# Patient Record
Sex: FEMALE | Race: BLACK OR AFRICAN AMERICAN | Employment: FULL TIME | URBAN - METROPOLITAN AREA
[De-identification: names, ages, dates, MRNs, and addresses within clinical notes are randomized per-mention and may not be internally consistent; named-entity substitution may affect disease eponyms.]

---

## 2017-01-18 ENCOUNTER — HOSPITAL ENCOUNTER (OUTPATIENT)
Dept: CT IMAGING | Age: 52
Discharge: HOME | End: 2017-01-18
Attending: NURSE PRACTITIONER
Payer: COMMERCIAL

## 2017-01-18 DIAGNOSIS — R31.29 MICROHEMATURIA: ICD-10-CM

## 2017-01-18 PROCEDURE — 74011000258 HC RX REV CODE- 258

## 2017-01-18 PROCEDURE — 74011636320 HC RX REV CODE- 636/320

## 2017-01-18 PROCEDURE — 74178 CT ABD&PLV WO CNTR FLWD CNTR: CPT

## 2017-01-18 RX ORDER — SODIUM CHLORIDE 0.9 % (FLUSH) 0.9 %
10 SYRINGE (ML) INJECTION
Status: COMPLETED | OUTPATIENT
Start: 2017-01-18 | End: 2017-01-18

## 2017-01-18 RX ADMIN — IOPAMIDOL 100 ML: 755 INJECTION, SOLUTION INTRAVENOUS at 09:27

## 2017-01-18 RX ADMIN — SODIUM CHLORIDE 100 ML: 900 INJECTION, SOLUTION INTRAVENOUS at 09:27

## 2017-01-18 RX ADMIN — Medication 10 ML: at 09:27

## 2017-01-30 ENCOUNTER — HOSPITAL ENCOUNTER (OUTPATIENT)
Dept: MAMMOGRAPHY | Age: 52
Discharge: HOME | End: 2017-01-30
Attending: NURSE PRACTITIONER
Payer: COMMERCIAL

## 2017-01-30 DIAGNOSIS — Z12.39 SCREENING FOR BREAST CANCER: ICD-10-CM

## 2017-01-30 PROCEDURE — 77067 SCR MAMMO BI INCL CAD: CPT

## 2017-01-31 NOTE — PROGRESS NOTES
Notify pt of the need for further imaging and then call [facility] and ask what U/S or imaging study that they need for me to order.  Then I will order it

## 2017-02-08 ENCOUNTER — HOSPITAL ENCOUNTER (OUTPATIENT)
Dept: MAMMOGRAPHY | Age: 52
Discharge: HOME | End: 2017-02-08
Attending: NURSE PRACTITIONER
Payer: COMMERCIAL

## 2017-02-08 DIAGNOSIS — R92.8 ABNORMAL MAMMOGRAM OF RIGHT BREAST: ICD-10-CM

## 2017-02-08 PROCEDURE — 76642 ULTRASOUND BREAST LIMITED: CPT

## 2017-02-08 NOTE — PROGRESS NOTES
U/S birads 2 benign. Likely a sebaceous cyst to right axilla. Had referred to [facility] Surgery prior to look at. Did she go?

## 2017-03-13 PROBLEM — L50.9 URTICARIA: Status: ACTIVE | Noted: 2017-03-13

## 2017-03-13 PROBLEM — Z87.898 HISTORY OF ITCHING: Status: ACTIVE | Noted: 2017-03-13

## 2018-12-03 PROBLEM — E66.01 SEVERE OBESITY (HCC): Status: ACTIVE | Noted: 2018-12-03

## 2018-12-21 ENCOUNTER — HOSPITAL ENCOUNTER (OUTPATIENT)
Dept: MAMMOGRAPHY | Age: 53
Discharge: HOME | End: 2018-12-21
Attending: FAMILY MEDICINE
Payer: COMMERCIAL

## 2018-12-21 DIAGNOSIS — Z12.39 BREAST CANCER SCREENING: ICD-10-CM

## 2018-12-21 PROCEDURE — 77067 SCR MAMMO BI INCL CAD: CPT

## 2019-06-07 ENCOUNTER — HOSPITAL ENCOUNTER (OUTPATIENT)
Age: 54
Setting detail: OUTPATIENT SURGERY
Discharge: HOME | End: 2019-06-07
Attending: SURGERY | Admitting: SURGERY
Payer: COMMERCIAL

## 2019-06-07 ENCOUNTER — ANESTHESIA EVENT (OUTPATIENT)
Dept: ENDOSCOPY | Age: 54
End: 2019-06-07
Payer: COMMERCIAL

## 2019-06-07 ENCOUNTER — ANESTHESIA (OUTPATIENT)
Dept: ENDOSCOPY | Age: 54
End: 2019-06-07
Payer: COMMERCIAL

## 2019-06-07 VITALS
SYSTOLIC BLOOD PRESSURE: 134 MMHG | WEIGHT: 185 LBS | TEMPERATURE: 98.2 F | DIASTOLIC BLOOD PRESSURE: 68 MMHG | HEART RATE: 74 BPM | HEIGHT: 61 IN | RESPIRATION RATE: 15 BRPM | BODY MASS INDEX: 34.93 KG/M2 | OXYGEN SATURATION: 99 %

## 2019-06-07 PROCEDURE — 76060000031 HC ANESTHESIA FIRST 0.5 HR: Performed by: SURGERY

## 2019-06-07 PROCEDURE — 76040000025: Performed by: SURGERY

## 2019-06-07 PROCEDURE — 74011250636 HC RX REV CODE- 250/636: Performed by: ANESTHESIOLOGY

## 2019-06-07 PROCEDURE — 74011250636 HC RX REV CODE- 250/636

## 2019-06-07 RX ORDER — SODIUM CHLORIDE 0.9 % (FLUSH) 0.9 %
5-40 SYRINGE (ML) INJECTION AS NEEDED
Status: CANCELLED | OUTPATIENT
Start: 2019-06-07

## 2019-06-07 RX ORDER — OXYCODONE AND ACETAMINOPHEN 10; 325 MG/1; MG/1
1 TABLET ORAL AS NEEDED
Status: CANCELLED | OUTPATIENT
Start: 2019-06-07

## 2019-06-07 RX ORDER — HYDROMORPHONE HYDROCHLORIDE 2 MG/ML
0.5 INJECTION, SOLUTION INTRAMUSCULAR; INTRAVENOUS; SUBCUTANEOUS
Status: CANCELLED | OUTPATIENT
Start: 2019-06-07

## 2019-06-07 RX ORDER — SODIUM CHLORIDE, SODIUM LACTATE, POTASSIUM CHLORIDE, CALCIUM CHLORIDE 600; 310; 30; 20 MG/100ML; MG/100ML; MG/100ML; MG/100ML
75 INJECTION, SOLUTION INTRAVENOUS CONTINUOUS
Status: DISCONTINUED | OUTPATIENT
Start: 2019-06-07 | End: 2019-06-07 | Stop reason: HOSPADM

## 2019-06-07 RX ORDER — OXYCODONE HYDROCHLORIDE 5 MG/1
5 TABLET ORAL
Status: CANCELLED | OUTPATIENT
Start: 2019-06-07 | End: 2019-06-08

## 2019-06-07 RX ORDER — SODIUM CHLORIDE 0.9 % (FLUSH) 0.9 %
5-40 SYRINGE (ML) INJECTION EVERY 8 HOURS
Status: CANCELLED | OUTPATIENT
Start: 2019-06-07

## 2019-06-07 RX ORDER — PROPOFOL 10 MG/ML
INJECTION, EMULSION INTRAVENOUS AS NEEDED
Status: DISCONTINUED | OUTPATIENT
Start: 2019-06-07 | End: 2019-06-07 | Stop reason: HOSPADM

## 2019-06-07 RX ORDER — LIDOCAINE HYDROCHLORIDE 20 MG/ML
INJECTION, SOLUTION EPIDURAL; INFILTRATION; INTRACAUDAL; PERINEURAL AS NEEDED
Status: DISCONTINUED | OUTPATIENT
Start: 2019-06-07 | End: 2019-06-07 | Stop reason: HOSPADM

## 2019-06-07 RX ORDER — PROPOFOL 10 MG/ML
INJECTION, EMULSION INTRAVENOUS
Status: DISCONTINUED | OUTPATIENT
Start: 2019-06-07 | End: 2019-06-07 | Stop reason: HOSPADM

## 2019-06-07 RX ADMIN — LIDOCAINE HYDROCHLORIDE 60 MG: 20 INJECTION, SOLUTION EPIDURAL; INFILTRATION; INTRACAUDAL; PERINEURAL at 10:38

## 2019-06-07 RX ADMIN — PROPOFOL 160 MCG/KG/MIN: 10 INJECTION, EMULSION INTRAVENOUS at 10:38

## 2019-06-07 RX ADMIN — PROPOFOL 40 MG: 10 INJECTION, EMULSION INTRAVENOUS at 10:38

## 2019-06-07 RX ADMIN — SODIUM CHLORIDE, SODIUM LACTATE, POTASSIUM CHLORIDE, AND CALCIUM CHLORIDE 75 ML/HR: 600; 310; 30; 20 INJECTION, SOLUTION INTRAVENOUS at 09:52

## 2019-06-07 NOTE — PROCEDURES
Procedure in Detail:  Informed consent was obtained for the procedure. The patient was placed in the left lateral decubitus position and sedation was induced by anesthesia. The [id number] was inserted into the rectum and advanced under direct vision to the cecum, which was identified by the ileocecal valve and appendiceal orifice. The quality of the colonic preparation was good. A careful inspection was made as the colonoscope was withdrawn, including a retroflexed view of the rectum; findings and interventions are described below. Appropriate photodocumentation was obtained. Findings:   Mild generalized melanosis noted. ANUS: Anal exam reveals no masses or hemorrhoids, sphincter tone is normal.   RECTUM: Rectal exam reveals no masses or hemorrhoids. SIGMOID COLON: The mucosa is normal with good vascular pattern and without ulcers, diverticula, and polyps. DESCENDING COLON: The mucosa is normal with good vascular pattern and without ulcers, diverticula, and polyps. SPLENIC FLEXURE: The splenic flexure is normal.   TRANSVERSE COLON: The mucosa is normal with good vascular pattern and without ulcers, diverticula, and polyps. HEPATIC FLEXURE: The hepatic flexure is normal.   ASCENDING COLON: The mucosa is normal with good vascular pattern and without ulcers, diverticula, and polyps. CECUM: The appendiceal orifice appears normal. The ileocecal valve appears normal.   TERMINAL ILEUM: The terminal ileum was not entered. Specimens: No specimens were collected. Complications: None; patient tolerated the procedure well. \    EBL - none    Recommendations:   - For colon cancer screening in this average-risk patient, colonoscopy may be repeated in 10 years.      Signed By: Chepe Hewitt MD                        June 7, 2019

## 2019-06-07 NOTE — ANESTHESIA PREPROCEDURE EVALUATION
Relevant Problems   No relevant active problems       Anesthetic History   No history of anesthetic complications            Review of Systems / Medical History  Patient summary reviewed and pertinent labs reviewed    Pulmonary  Within defined limits                 Neuro/Psych   Within defined limits           Cardiovascular                  Exercise tolerance: >4 METS     GI/Hepatic/Renal     GERD           Endo/Other        Morbid obesity and arthritis     Other Findings            Physical Exam    Airway  Mallampati: III  TM Distance: > 6 cm  Neck ROM: normal range of motion   Mouth opening: Normal     Cardiovascular    Rhythm: regular           Dental    Dentition: Caps/crowns     Pulmonary                 Abdominal  GI exam deferred       Other Findings            Anesthetic Plan    ASA: 3            Induction: Intravenous  Anesthetic plan and risks discussed with: Patient

## 2019-06-07 NOTE — PROGRESS NOTES
's pre-procedure visit requested by patient. Conveyed care and concern for patient and family. Offered prayer as requested for patient, family, and staff.     Nga Osullivan MDiv, BS  Board Certified

## 2019-06-07 NOTE — DISCHARGE INSTRUCTIONS
Kimberlyn Davila M.D.  [phone]    Instructions following colonoscopy:    ACTIVITY:   Resume usual, basic activities around the house today.  You may be light-headed or sleepy from anesthesia, so be careful going up and down stairs.  Avoid driving, operating machinery, or signing documents for 24 hours. DIET:   No restriction. Please note, some people may have nausea or cramps after this procedure which can result in an upset stomach after eating.  Many people have loose stools or diarrhea immediately after colonoscopy. It is also not uncommon to not have a bowel movement for 2-3 days. PAIN:   Some cramping or gas pain is normal after colonoscopy. However, if you experience worsening pain over the course of the day, or pain with associated fever please call the office immediately      [address] IF:   You have a temperature higher than 101.5° Fahrenheit for more than 6 hours.  You have severe nausea or vomiting not relieved by medication; or diarrhea. If you take a blood thinning medicine resume it:    Otherwise, continue home medications as previously prescribed.

## 2019-06-07 NOTE — ANESTHESIA POSTPROCEDURE EVALUATION
Procedure(s):  COLONOSCOPY/ 36. No value filed. Anesthesia Post Evaluation      Multimodal analgesia: multimodal analgesia not used between 6 hours prior to anesthesia start to PACU discharge  Patient location during evaluation: PACU  Patient participation: complete - patient participated  Level of consciousness: awake  Pain management: adequate  Airway patency: patent  Anesthetic complications: no  Cardiovascular status: acceptable  Respiratory status: acceptable  Hydration status: acceptable  Post anesthesia nausea and vomiting:  none      Vitals Value Taken Time   /70 6/7/2019 10:58 AM   Temp     Pulse 81 6/7/2019 10:58 AM   Resp 16 6/7/2019 10:58 AM   SpO2 100 % 6/7/2019 10:58 AM   Vitals shown include unvalidated device data.

## 2019-06-07 NOTE — H&P
History and Physical      Patient: Simaa Blizzard    Physician: Yandel Velarde MD    Referring Physician: Lauro Laboy MD    Chief Complaint: For colonoscopy    History of Present Illness: Pt presents for colonoscopy. iniital screening study. History:  Past Medical History:   Diagnosis Date    Arthritis     in the left knee and heel    GERD (gastroesophageal reflux disease)      Past Surgical History:   Procedure Laterality Date    HX TONSILLECTOMY  2015      Social History     Socioeconomic History    Marital status:      Spouse name: Not on file    Number of children: Not on file    Years of education: Not on file    Highest education level: Not on file   Tobacco Use    Smoking status: Never Smoker    Smokeless tobacco: Never Used   Substance and Sexual Activity    Alcohol use: No     Alcohol/week: 0.0 oz    Drug use: Not Currently   Other Topics Concern     Service No    Blood Transfusions No    Caffeine Concern No    Occupational Exposure No    Hobby Hazards No    Sleep Concern No    Stress Concern No    Weight Concern No    Special Diet No    Back Care Yes    Exercise No    Bike Helmet No    Seat Belt Yes    Self-Exams Yes      Family History   Problem Relation Age of Onset    Hypertension Father     Stroke Father     Cancer Sister         lung    Breast Cancer Neg Hx        Medications:   Prior to Admission medications    Medication Sig Start Date End Date Taking? Authorizing Provider   hydrOXYzine HCl (ATARAX) 10 mg tablet 1 tab p.o. twice daily as needed itching. 3/4/19  Yes Lauro Laboy MD   fluticasone (FLONASE) 50 mcg/actuation nasal spray 2 Sprays by Both Nostrils route daily. Patient taking differently: 2 Sprays by Both Nostrils route as needed. 3/4/19   Leonor Copeland MD   omeprazole (PRILOSEC) 20 mg capsule Take 1 Cap by mouth daily. Patient taking differently: Take 20 mg by mouth as needed.  Indications: gastroesophageal reflux disease 12/3/18   Damon Stein MD       Allergies: No Known Allergies    Physical Exam:     Vital Signs:   Visit Vitals  /77   Pulse 71   Temp 98.2 °F (36.8 °C)   Resp 18   Ht 5' 1\" (1.549 m)   Wt 185 lb (83.9 kg)   SpO2 99%   BMI 34.96 kg/m²     . General: no distress      Heart: regular   Lungs: unlabored   Abdominal: soft   Neurological: Grossly normal        Findings/Diagnosis: Screening for colorectal cancer     Plan of Care/Planned Procedure: Colonoscopy, possible polypectomy. Pt/designee has reviewed the colonoscopy information sheet. Any questions have been discussed. They agree to proceed.       Signed:  Jocelin Jaime MD   6/7/2019

## 2019-06-07 NOTE — ROUTINE PROCESS
VSS. Discharge instructions reviewed with patient and daughter and copy of instructions sent home with patient. Dr. Gurvinder Bañuelos spoke with patient and daughter prior to discharge. Questions answered. Discharged via private car, wheeled out by myself. IV discontinued prior to discharge.      Personal items with patient at discharge: clothing

## 2020-03-12 ENCOUNTER — HOSPITAL ENCOUNTER (OUTPATIENT)
Dept: MAMMOGRAPHY | Age: 55
Discharge: HOME | End: 2020-03-12
Attending: FAMILY MEDICINE
Payer: COMMERCIAL

## 2020-03-12 DIAGNOSIS — Z12.31 VISIT FOR SCREENING MAMMOGRAM: ICD-10-CM

## 2020-03-12 PROCEDURE — 77067 SCR MAMMO BI INCL CAD: CPT

## 2020-12-14 PROBLEM — L50.9 URTICARIA: Status: RESOLVED | Noted: 2017-03-13 | Resolved: 2020-12-14

## 2020-12-14 PROBLEM — Z87.898 HISTORY OF ITCHING: Status: RESOLVED | Noted: 2017-03-13 | Resolved: 2020-12-14

## 2021-03-15 ENCOUNTER — TRANSCRIBE ORDER (OUTPATIENT)
Dept: SCHEDULING | Age: 56
End: 2021-03-15

## 2021-03-15 DIAGNOSIS — Z12.31 SCREENING MAMMOGRAM, ENCOUNTER FOR: Primary | ICD-10-CM

## 2021-03-23 PROBLEM — R73.03 PREDIABETES: Status: ACTIVE | Noted: 2021-03-23

## 2021-03-31 ENCOUNTER — HOSPITAL ENCOUNTER (OUTPATIENT)
Dept: MAMMOGRAPHY | Age: 56
Discharge: HOME | End: 2021-03-31
Attending: NURSE PRACTITIONER
Payer: COMMERCIAL

## 2021-03-31 DIAGNOSIS — Z12.31 SCREENING MAMMOGRAM, ENCOUNTER FOR: ICD-10-CM

## 2021-03-31 PROCEDURE — 77067 SCR MAMMO BI INCL CAD: CPT

## 2021-04-02 NOTE — PROGRESS NOTES
Benign appearing lymph nodes, scattered fibroglandular areas; no findings that suggest malignancy. Follow-up mammogram in one year.

## 2021-04-07 ENCOUNTER — TELEPHONE (OUTPATIENT)
Dept: NUTRITION | Age: 56
End: 2021-04-07

## 2021-04-07 NOTE — TELEPHONE ENCOUNTER
Nutrition Counseling: Contacted pt regarding referral. See notes documented in Nutrition Counseling Referral for details. No further follow-up contact from pt. Will close referral for this office.     [address] [facility]  [phone]

## 2021-04-14 ENCOUNTER — HOSPITAL ENCOUNTER (OUTPATIENT)
Dept: PHYSICAL THERAPY | Age: 56
Discharge: HOME | End: 2021-04-14
Attending: NURSE PRACTITIONER
Payer: COMMERCIAL

## 2021-04-14 DIAGNOSIS — M54.6 CHRONIC RIGHT-SIDED THORACIC BACK PAIN: ICD-10-CM

## 2021-04-14 DIAGNOSIS — G89.29 CHRONIC RIGHT-SIDED THORACIC BACK PAIN: ICD-10-CM

## 2021-04-14 PROCEDURE — 97162 PT EVAL MOD COMPLEX 30 MIN: CPT

## 2021-04-14 PROCEDURE — 97140 MANUAL THERAPY 1/> REGIONS: CPT

## 2021-04-14 NOTE — THERAPY EVALUATION
Hansel Huang : 1965 Primary: [facility] [place] [facility] Secondary:  Therapy Center at [address] [place] [address] 68, [address] [facility], [place] [address] [address] [facility] [phone]   [phone] OUTPATIENT PHYSICAL THERAPY:Initial Assessment 2021 ICD-10: Treatment Diagnosis: Cervicalgia (M54.2) Pain in thoracic spine (M54.6) Stiffness of right shoulder, not elsewhere classified (M25.611) Precautions/Allergies:  
Patient has no known allergies. TREATMENT PLAN: 
Effective Dates/Frequency/Duration: Twice per week from 2021 until 2021 (10 weeks). MEDICAL/REFERRING DIAGNOSIS: 
Chronic right-sided thoracic back pain [M54.6, G89.29] DATE OF ONSET: 1 year ago (acute exacerbation of chronic pain) REFERRING PHYSICIAN: Macario Weber NP MD Orders: Evaluate and treat Return MD Appointment: unspecified INITIAL ASSESSMENT:  Hansel Huang is a 60 y.o. female who presents to physical therapy for acute exacerbation of chronic mid back pain. This session, pt demonstrated decreased B UE strength/endurance, decreased right shoulder and thoracic mobility with associated pain, multiple postural deficits, decreased sitting tolerance and decreased functional mobility as evident by a score of 1/50 on the Neck Disability Index (with higher scores indicating increased disability), 18/55 on the Disabilities of the Arm, Shoulder, and Hand Questionnaire (with higher scores indicating increased disability) and decreased ability to turn/lift. Pt may benefit from skilled PT to address the above listed deficits to improve ability to perform pain-free ADLs/IADLs and to improve overall quality of life prior to discharge. PROBLEM LIST (Impacting functional limitations): 1. Decreased Strength 2. Decreased ADL/Functional Activities 3. Increased Pain 4. Decreased Activity Tolerance 5. Decreased Work Simplification/Energy Conservation Techniques 6. Increased Fatigue 7. Decreased Flexibility/Joint Mobility 8. Edema/Girth INTERVENTIONS PLANNED: (Treatment may consist of any combination of the following) 1. Bed Mobility 2. Cold 3. Electrical Stimulation 4. Heat 5. Home Exercise Program (HEP) 6. Manual Therapy 7. Neuromuscular Re-education/Strengthening 8. Range of Motion (ROM) 9. Therapeutic Activites 10. Therapeutic Exercise/Strengthening 11. Transcutaneous Electrical Nerve Stimulation (TENS) 12. Ultrasound (US)  
 
GOALS: (Goals have been discussed and agreed upon with patient.) Short-Term Functional Goals: Time Frame: 5 weeks 1. Pt will be compliant with HEP in order to increase cervical mobility and UE strength/endurance/mobility to improve functional mobility and overall quality of life. 2. Pt will improve score on the Disabilities of the Arm, Shoulder, and Hand (DASH) Questionnaire to 14/55 in order to improve independence with ADLs and IADLs and to improve overall quality of life. 3. Pt will reduce score on Neck Disability Index to 0/50 in order to demonstrate improved function during activities of daily living. 4. Pt will increase right shoulder internal rotation AROM to level of T10 with minimal to no increase in pain in order to improve functional mobility and ability to wash her back. 5. Pt will be able to demonstrate good body mechanics while lifting 5 lb object up from the floor in order to minimize pain while lifting parts at her job. Discharge Goals: Time Frame: 10 weeks 1. Pt will be independent with HEP in order to increase cervical mobility and UE strength/endurance/mobility to improve functional mobility and overall quality of life. 2. Pt will improve score on the Disabilities of the Arm, Shoulder, and Hand (DASH) Questionnaire to 11/55 in order to improve independence with ADLs and IADLs and to improve overall quality of life.  
3. Pt will increase right shoulder internal rotation AROM to level of T8 with minimal to no increase in pain in order to improve functional mobility and ability to wash her back. 4. Pt will be able to demonstrate good body mechanics while lifting 15 lb object up from the floor in order to minimize pain while lifting parts at her job. OUTCOME MEASURE:  
Tool Used: Disabilities of the Arm, Shoulder and Hand (DASH) Questionnaire - Quick Version Score:  Initial: 18/55  Most Recent: X/55 (Date: -- ) Interpretation of Score: The DASH is designed to measure the activities of daily living in person's with upper extremity dysfunction or pain. Each section is scored on a 1-5 scale, 5 representing the greatest disability. The scores of each section are added together for a total score of 55. Tool Used: Neck Disability Index (NDI) Score:  Initial: 1/50  Most Recent: X/50 (Date: -- ) Interpretation of Score: The Neck Disability Index is a revised form of the Oswestry Low Back Pain Index and is designed to measure the activities of daily living in person's with neck pain. Each section is scored on a 0-5 scale, 5 representing the greatest disability. The scores of each section are added together for a total score of 50. FALL RISK: 
 
Ambulatory/Rehab Services H2 Model Falls Risk Assessment Risk Factors: 
     No Risk Factors Identified Ability to Rise from Chair: 
     (0)  Ability to rise in a single movement Falls Prevention Plan: No modifications necessary Total: (5 or greater = High Risk): 0  
©2010 [facility] of [employer] States Patent #[id number]. Federal Law prohibits the replication, distribution or use without written permission from [facility] [employer] UPDATED OBJECTIVE FINDINGS:    
Initial assessment only today: see objective section below for details MEDICAL NECESSITY:  
· Patient is expected to demonstrate progress in strength, range of motion and functional technique to improve ability to perform pain-free ADLs/IADLs and to improve overall quality of life. 
REASON FOR SERVICES/OTHER COMMENTS: 
· Patient continues to require modification of therapeutic interventions to increase complexity of exercises. Total Duration: PT Patient Time In/Time Out Time In: 1838 Time Out: 1021 Rehabilitation Potential For Stated Goals: Good Regarding Nikki Roe's therapy, I certify that the treatment plan above will be carried out by a therapist or under their direction. Thank you for this referral, Ainsley Egan DPT Referring Physician Signature: Liz Castanon NP              
 
PAIN/SUBJECTIVE:  
Initial: 7/10 (sore and stiff) Post Session:  0/10 HISTORY:  
History of Injury/Illness (Reason for Referral): *History per pt or pt's family except where otherwise noted Location(s) of Injury: R>L thoracic region; states occasionally her R upper trap/deltoid will ache Date of Injury: original injury was 25 years ago, pain started to get worse again about a year ago (states most likely due to her working more hours) Mechanism of Injury: States she originally pulled a muscle in her R midback when working - states it has gotten flared up again recently due to her working and lifting Pain at Worst: 8-9/10 Aggravating Activities: lying on either side >15 minutes increases stiffness, states occasionally if she moves the \"wrong way\" she'll get a \"catch\" in her back and then she gets spasms as well, turning to R side/bending can cause pain if she does not do it carefully, prolonged sitting > 60 minutes Pain at Best: 0/10 Easing Activities: moving to a different position, massage Past Medical History/Comorbidities: Ms. Melania Ball  has a past medical history of Arthritis, Elevated blood pressure reading without diagnosis of hypertension (70/47/8435), Folliculitis (06/6/2504), Folliculitis (64/4/1982), GERD (gastroesophageal reflux disease), History of itching (3/13/2017), Nail thickening (12/1/2015), and Urticaria (3/13/2017).  She also has no past medical history of Chronic kidney disease, Diabetes ([facility] [address] or Liver disease. Ms. Angie Franklin  has a past surgical history that includes hx tonsillectomy (2015); pr colonoscopy flx dx w/collj spec when pfrmd (6/7/2019); and colonoscopy (N/A, 6/7/2019). Social History/Living Environment:  
Lives with  and grown son (help as needed) in single level house with no steps to get in 
Prior Level of Function/Work/Activity: 
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  
      RIGHT Other Clinical Tests:   
      none Previous Treatment Approaches:   
      Therapy after initial injury helped a lot Personal Factors:   
      Sex:  female Age:  60 y.o. Current Medications:   
  
Current Outpatient Medications:   cyclobenzaprine (FLEXERIL) 5 mg tablet, Take 1 Tab by mouth two (2) times daily as needed for Muscle Spasm(s). , Disp: 28 Tab, Rfl: 0 
  loratadine (Claritin) 10 mg tablet, Take 1 Tab by mouth daily as needed for Allergies or Itching., Disp: 90 Tab, Rfl: 0 
  olopatadine (PATADAY) 0.2 % drop ophthalmic solution, Administer 1 Drop to both eyes daily as needed for Other (allergic conjunctivitis). Indications: allergic conjunctivitis, Disp: 5 mL, Rfl: 0 
  fluticasone propionate (Flonase) 50 mcg/actuation nasal spray, 2 Sprays by Both Nostrils route as needed for Allergies. , Disp: 1 Bottle, Rfl: 3 Date Last Reviewed:  4/14/2021 Number of Personal Factors/Comorbidities that affect the Plan of Care: 1-2: MODERATE COMPLEXITY EXAMINATION:  
Initial assessment on 4/14/2021 Observation/Orthostatic Postural Assessment: The following postural deficits were noted in sitting: loss of cervical lordosis, increased thoracic kyphosis, forward head, rounded shoulders and posterior pelvic tilt The following postural deficits were noted in standing: excessive lumbar lordosis; pt overweight Palpation:   
      Central and unilateral (each side) PA mobilizations at C6-T7 painful and stiff; central PA mobilizations at lumbar and cervical spine slightly stiff but not tender ROM:   
Initial measurement: (on 4/14/2021) Initial measurement: (on 4/14/2021) Reassessment measurement:  Reassessment measurement: WFL and non-painful throughout L shoulder, elbow, and hand L shoulder IR AROM to level of T8 WFL throughout R shoulder, elbow and wrist, but limited with following motions: R shoulder IR AROM limited to level of T11 Initial measurement: (on 4/14/2021) Reassessment measurement: Reassessment measurement:   
 
Cervical AROM Flexion (% of normal): 100% Extension (% of normal): 100% L sidebending (% of normal): 100% R sidebending (% of normal): 100% L rotation (% of normal): 90% R rotation (% of normal): 100% Thoracic AROM Flexion (% of normal): 100% (increased tightness) Extension (% of normal): 80% L sidebending (% of normal): 100% (increased tightness) R sidebending (% of normal): 100% (increased tightness) L rotation (% of normal): 100% R rotation (% of normal): 100% Strength:  
 Initial measurement: (on 4/14/2021) Initial measurement: (on 4/14/2021)  Reassessment Reassessment L UE: R UE:    
Shoulder flexion  4/5  4+/5 Shoulder extension 5/5  5/5 Shoulder abduction 4/5  4/5 Shoulder adduction 4+/5  4+/5 Shoulder IR  5/5  5/5 Shoulder ER 4+/5  4/5 Elbow flexion 5/5  5/5 Elbow extension 5/5  5/5 Wrist flexion  5/5  5/5 Wrist extension 5/5  5/5 Special Tests:   
      Cervical compression/distraction: - for changes Spurlings: - for changes Empty can: + on R for increased weakness and pain with IR Neurological Screen: Myotomes:  [facility] Dermatomes:  [facility] Body Structures Involved: 1. Nerves 2. Bones 3. Joints 4. Muscles 5.  Ligaments Body Functions Affected: 1. Sensory/Pain 2. Neuromusculoskeletal 
3. Movement Related Activities and Participation Affected: 1. General Tasks and Demands 2. Mobility 3. Self Care 4. Domestic Life 5. Interpersonal Interactions and Relationships 6. Community, Social and [place] Number of elements (examined above) that affect the Plan of Care: 4+: HIGH COMPLEXITY CLINICAL PRESENTATION:  
Presentation: Evolving clinical presentation with changing clinical characteristics: MODERATE COMPLEXITY CLINICAL DECISION MAKING:  
Use of outcome tool(s) and clinical judgement create a POC that gives a: Questionable prediction of patient's progress: MODERATE COMPLEXITY

## 2021-04-14 NOTE — PROGRESS NOTES
Damon De La Fuente  : 1965  Primary: [facility] [place]*  Secondary:  Therapy Center at [address] [place] [address] 68, 101 [facility], [place] [address] [facility]  [phone]   [phone]       OUTPATIENT PHYSICAL THERAPY: Daily Treatment Note 2021  Visit Count:  1  ICD-10: Treatment Diagnosis: Cervicalgia (M54.2)   Pain in thoracic spine (M54.6)   Stiffness of right shoulder, not elsewhere classified (M25.611)   Precautions/Allergies:   Patient has no known allergies. TREATMENT PLAN:  Effective Dates/Frequency/Duration: Twice per week from 2021 until 2021 (10 weeks). Pre-treatment Symptoms/Complaints:  See history above. Pain: Initial:   7/10 Post Session:  010   Medications Last Reviewed:  2021  Updated Objective Findings: See evaluation note from today   TREATMENT:   Manual therapy (9 minutes): With pt in supported prone position:   - STM and trigger point release to R>L rhomboids/middle traps, thoracic paraspinals, scapular musculature, and upper traps/deltoids to reduce muscular tightness and pain   - gentle central PA mobilizations (grade 2-3) to C6-T7 to improve thoracic mobility and reduce muscular tightness/pain    Modalities (10 minutes): With pt in supported sitting position, moist heat (with 6-8 towel layers) applied to B upper traps/deltoids to reduce muscular tightness and pain. Treatment/Session Summary:    · Response to Treatment:  See assessment above. No adverse reactions/unusual changes observed/reported in clinical status this session. · Communication/Consultation:  None today  · Equipment provided today:  None today  · Recommendations/Intent for next treatment session: Next visit will focus on manual therapy/modalities to reduce muscular tightness/pain; work on gentle cervical/thoracic strengthening/stability and scapular strengthening.     Total Treatment Billable Duration:  9 minutes  PT Patient Time In/Time Out  Time In: 9958  Time Out: 5601 [facility]    Future Appointments   Date Time Provider Roberto Gayatri   5/27/2021 10:15 AM Ewelina Mayo DPT [facility]   6/2/2021 10:15 AM Ewelina Mayo DPT [facility]   6/4/2021  9:30 AM Carmela Tidwell PTA [facility]   9/20/2021  9:20 AM Jose De Jesus Aleman NP [facility]        Visit Approval Visit # Therapist initials Date A NS / Cx < 24 hr >24 hr Cx Comments    1  4/14/2021 [x]  [] [] Initial evaluation       [] [] []        [] [] []        [] [] []        [] [] []        [] [] []        [] [] []        [] [] []        [] [] []        [] [] []        [] [] []        [] [] []        [] [] []        [] [] []        [] [] []        [] [] []        [] [] []        [] [] []

## 2021-04-21 ENCOUNTER — HOSPITAL ENCOUNTER (OUTPATIENT)
Dept: PHYSICAL THERAPY | Age: 56
Discharge: HOME | End: 2021-04-21
Attending: NURSE PRACTITIONER
Payer: COMMERCIAL

## 2021-04-21 PROCEDURE — 97110 THERAPEUTIC EXERCISES: CPT

## 2021-04-21 PROCEDURE — 97140 MANUAL THERAPY 1/> REGIONS: CPT

## 2021-04-21 NOTE — PROGRESS NOTES
Ramón Johnson  : 1965  Primary: Frederic Albrecht Of Zamzambalbir Michelinekiki*  Secondary:  [facility] at [facility] [address], [address] [facility], [place] [address] [address]  [phone]   [phone]       OUTPATIENT PHYSICAL THERAPY: Daily Treatment Note 2021  Visit Count:  2  ICD-10: Treatment Diagnosis: Cervicalgia (M54.2)   Pain in thoracic spine (M54.6)   Stiffness of right shoulder, not elsewhere classified (M25.611)   Precautions/Allergies:   Patient has no known allergies. TREATMENT PLAN:  Effective Dates/Frequency/Duration: Twice per week from 2021 until 2021 (10 weeks). Pre-treatment Symptoms/Complaints: Sleeping pretty good. Patient reports soreness and tightness today. Pain: Initial:   5/10 Post Session:  2/10   Medications Last Reviewed:  2021  Updated Objective Findings: None Today   TREATMENT:   Manual therapy (17 minutes): With pt in supported prone position:   - STM and trigger point release to R>L rhomboids/middle traps, thoracic paraspinals, scapular musculature, and upper traps/deltoids to reduce muscular tightness and pain    - gentle central PA mobilizations (grade 2-3) to C6-T7 to improve thoracic mobility and reduce muscular tightness/pain - not performed 2021    Modalities (10 minutes): With pt in supported sitting position, moist heat (with 6-8 towel layers) applied to B upper traps/deltoids to reduce muscular tightness and pain. THERAPEUTIC EXERCISE: (25 minutes):  Exercises per grid below to improve mobility and strength. Required minimal verbal cues to promote proper body alignment and promote proper body posture. Progressed resistance as indicated.    Date:  2021 Date:   Date:     Activity/Exercise Parameters Parameters Parameters   Nustep  Level 1  7 minutes     Rows Yellow   x 10 B     Shoulder ext Yellow   x 10 B     shrugs X 10 B     Backwards circles X 10 B                     Treatment/Session Summary: · Response to Treatment:  Patient reported upper back feeling more relaxed and mobile, but now stiffness in neck more noticeable. No adverse reactions/unusual changes observed/reported in clinical status this session. · Communication/Consultation:  None today  · Equipment provided today:  None today  · Recommendations/Intent for next treatment session: Next visit will focus on manual therapy/modalities to reduce muscular tightness/pain; work on gentle cervical/thoracic strengthening/stability and scapular strengthening.     Total Treatment Billable Duration:  42 minutes + 10 minutes MHP  PT Patient Time In/Time Out  Time In: 1301  Time Out: 541 Prashant Sheridan PTA    Future Appointments   Date Time Provider Roberto Mendieta   4/23/2021 10:15 AM Cody Rowe DPT [facility]   4/26/2021 10:15 AM Ammon Kitchen PTA [facility]   4/28/2021 10:15 AM Cody Rowe DPT [facility]   9/20/2021  9:20 AM Tracey Trevino NP [facility]        Visit Approval Visit # Therapist initials Date A NS / Cx < 24 hr >24 hr Cx Comments    1  4/14/2021 [x]  [] [] Initial evaluation    2 PDE 4- [x] [] []        [] [] []        [] [] []        [] [] []        [] [] []        [] [] []        [] [] []        [] [] []        [] [] []        [] [] []        [] [] []        [] [] []        [] [] []        [] [] []        [] [] []        [] [] []        [] [] []

## 2021-04-23 ENCOUNTER — HOSPITAL ENCOUNTER (OUTPATIENT)
Dept: PHYSICAL THERAPY | Age: 56
Discharge: HOME | End: 2021-04-23
Attending: NURSE PRACTITIONER
Payer: COMMERCIAL

## 2021-04-23 PROCEDURE — 97110 THERAPEUTIC EXERCISES: CPT

## 2021-04-23 PROCEDURE — 97140 MANUAL THERAPY 1/> REGIONS: CPT

## 2021-04-23 NOTE — PROGRESS NOTES
Kike Thomas  : 1965  Primary: Susan Whyte Of Alexander Sandhu*  Secondary:  [facility], [address] [facility], [place], [facility] [address] [facility]  [phone]   [phone]       OUTPATIENT PHYSICAL THERAPY: Daily Treatment Note 2021  Visit Count:  3  ICD-10: Treatment Diagnosis: Cervicalgia (M54.2)   Pain in thoracic spine (M54.6)   Stiffness of right shoulder, not elsewhere classified (M25.611)   Precautions/Allergies:   Patient has no known allergies. TREATMENT PLAN:  Effective Dates/Frequency/Duration: Twice per week from 2021 until 2021 (10 weeks). Pre-treatment Symptoms/Complaints: Pt states she has been feeling a lot better since starting therapy; 8 minutes late today due to traffic  Pain: Initial:   0/10 Post Session:  0/10   Medications Last Reviewed:  2021  Updated Objective Findings: None Today   TREATMENT:   THERAPEUTIC EXERCISE: (25 minutes):  Exercises per grid below to improve mobility and strength. Required minimal verbal cues to promote proper body alignment and promote proper body posture. Progressed resistance as indicated.      Date:  2021 Date:   Date:   Activity/Exercise Parameters Parameters    UBE Level 2 resistance, 4 minutes forward and 4 minutes backward     Standing shoulder rows* Yellow theratubing resistance; 20 reps/1 set with cues for slow controlled movement and avoiding shoulder elevation     Standing shoulder exension* Yellow theratubing resistance; 20 reps/1 set with cues for avoiding shoulder elevation and to maintain elbow extension throughout     Shoulder shrugs 20 reps/1 set with cues for complete shoulder relaxation between each rep     Backward shoulder circles 10 reps/1 set     Standing shoulder ER* Yellow theratubing resistance; 10 reps/1 set each UE with cues to maintain elbow by side           *given in HEP  MedBridge Portal   Pt given following band colors: [] Yellow          [] Red [] Green          [] Blue          [] Grey     Manual therapy (15 minutes): With pt in supported supine position:   - STM and trigger point release to R rhomboids/middle traps, scapular musculature, and upper traps/deltoids to reduce muscular tightness and pain    - gentle inferior mobilizations to R 1st rib (grade 2-3) to reduce muscular tightness and improve mobility    Modalities (10 minutes): With pt in supported sitting position, moist heat (with 6-8 towel layers) applied to B upper traps/deltoids to reduce muscular tightness and pain. Treatment/Session Summary:    · Response to Treatment:  Patient reported feeling better at end of session. She was able to progress strengthening exercises this session with no reports of increased pain. Will continue to progress exercises as tolerated by pt. No adverse reactions/unusual changes observed/reported in clinical status this session. · Communication/Consultation:  None today  · Equipment provided today:  None today  · Recommendations/Intent for next treatment session: Next visit will focus on manual therapy/modalities to reduce muscular tightness/pain; work on gentle cervical/thoracic strengthening/stability and scapular strengthening.     Total Treatment Billable Duration:  38 minutes  PT Patient Time In/Time Out  Time In: 7272  Time Out: Wiley [address], DPT    Future Appointments   Date Time Provider Roberto Mendieta   4/26/2021 10:15 AM Edison Branham PTA [facility]   4/28/2021 10:15 AM Berna Varela DPT [facility]   9/20/2021  9:20 AM Desmond Culver NP [facility]        Visit Approval Visit # Therapist initials Date A NS / Cx < 24 hr >24 hr Cx Comments    1  4/14/2021 [x]  [] [] Initial evaluation    2 PDE 4- [x] [] []     3  4/23/2021 [x] [] []        [] [] []        [] [] []        [] [] []        [] [] []        [] [] []        [] [] []        [] [] []        [] [] []        [] [] []        [] [] []        [] [] []        [] [] []        [] [] []        [] [] []        [] [] []

## 2021-04-26 ENCOUNTER — HOSPITAL ENCOUNTER (OUTPATIENT)
Dept: PHYSICAL THERAPY | Age: 56
Discharge: HOME | End: 2021-04-26
Attending: NURSE PRACTITIONER
Payer: COMMERCIAL

## 2021-04-26 PROCEDURE — 97110 THERAPEUTIC EXERCISES: CPT

## 2021-04-26 PROCEDURE — 97140 MANUAL THERAPY 1/> REGIONS: CPT

## 2021-04-26 NOTE — PROGRESS NOTES
Ninomagda Hugh  : 1965  Primary: Oliva Garg Of Alexander Sandhu*  Secondary:  [facility]rapy Center at [address] [place] [address] [address], [address], [place] [address] [address] [facility]  [phone]   [phone]       OUTPATIENT PHYSICAL THERAPY: Daily Treatment Note 2021  Visit Count:  4  ICD-10: Treatment Diagnosis: Cervicalgia (M54.2)   Pain in thoracic spine (M54.6)   Stiffness of right shoulder, not elsewhere classified (M25.611)   Precautions/Allergies:   Patient has no known allergies. TREATMENT PLAN:  Effective Dates/Frequency/Duration: Twice per week from 2021 until 2021 (10 weeks). Pre-treatment Symptoms/Complaints: bilateral shoulder pain and thoracic stiffness today. Pain: Initial:   3/10 Post Session:  2/10   Medications Last Reviewed:  2021  Updated Objective Findings: None Today   TREATMENT:   THERAPEUTIC EXERCISE: (25 minutes):  Exercises per grid below to improve mobility and strength. Required minimal verbal cues to promote proper body alignment and promote proper body posture. Progressed resistance as indicated. Date:  2021 Date:   Date:   Activity/Exercise Parameters Parameters     nustep  Level 2 resistance 12 mins with layered moist hot pack to the back.             Standing shoulder rows* Yellow theratubing resistance; 20 reps/1 set with cues for slow controlled movement and avoiding shoulder elevation     Standing shoulder exension* Yellow theratubing resistance; 20 reps/1 set with cues for avoiding shoulder elevation and to maintain elbow extension throughout     Shoulder shrugs 20 reps/1 set with cues for complete shoulder relaxation between each rep     Backward shoulder circles 10 reps/1 set     Standing shoulder ER/IR bilateral shoulders  Yellow theratubing resistance; 10 reps/1 set each UE with cues to maintain elbow by side     STanding protraction  10 x's      *given in HEP  MedBridge Portal   Pt given following band colors: [] Yellow [] Red          [] Green          [] Blue          [] Grey     Manual therapy (15 minutes): With pt in supported supine position:   - STM and trigger point release to R rhomboids/middle traps, scapular musculature, and upper traps/deltoids to reduce muscular tightness and pain    - gentle inferior mobilizations to R 1st rib (grade 2-3) to reduce muscular tightness and improve mobility    Modalities (10 minutes): With pt in supported sitting position, moist heat (with 6-8 towel layers) applied to B upper traps/deltoids to reduce muscular tightness and pain. Treatment/Session Summary:    · Response to Treatment:  Patient reported feeling better at end of session. Progression to nustep with heat. No adverse reactions/unusual changes observed/reported in clinical status this session. · Communication/Consultation:  None today  · Equipment provided today:  None today  · Recommendations/Intent for next treatment session: Next visit will focus on manual therapy/modalities to reduce muscular tightness/pain; work on gentle cervical/thoracic strengthening/stability and scapular strengthening.     Total Treatment Billable Duration:  40 minutes  PT Patient Time In/Time Out  Time In: 2312  Time Out: 1501 [place], [place]    Future Appointments   Date Time Provider Roberto Mendieta   4/28/2021 10:15 AM Tamika Roy DPT [facility]   9/20/2021  9:20 AM Cristela Narayan NP [facility]        Visit Approval Visit # Therapist initials Date A NS / Cx < 24 hr >24 hr Cx Comments    1  4/14/2021 [x]  [] [] Initial evaluation    2 PDE 4- [x] [] []     3  4/23/2021 [x] [] []     4 Laurent  4/26 [x] [] []        [] [] []        [] [] []        [] [] []        [] [] []        [] [] []        [] [] []        [] [] []        [] [] []        [] [] []        [] [] []        [] [] []        [] [] []        [] [] []        [] [] []

## 2021-04-28 ENCOUNTER — HOSPITAL ENCOUNTER (OUTPATIENT)
Dept: PHYSICAL THERAPY | Age: 56
Discharge: HOME | End: 2021-04-28
Attending: NURSE PRACTITIONER
Payer: COMMERCIAL

## 2021-04-28 PROCEDURE — 97140 MANUAL THERAPY 1/> REGIONS: CPT

## 2021-04-28 PROCEDURE — 97110 THERAPEUTIC EXERCISES: CPT

## 2021-04-28 NOTE — PROGRESS NOTES
Donato Gasparanson  : 1965  Primary: Megan Carrasquillo Of Alexander Sandhu*  Secondary:  [facility]rapy Center at [facility] [address], [address] [facility], [address]  [phone]   [phone]       OUTPATIENT PHYSICAL THERAPY: Daily Treatment Note 2021  Visit Count:  5  ICD-10: Treatment Diagnosis: Cervicalgia (M54.2)   Pain in thoracic spine (M54.6)   Stiffness of right shoulder, not elsewhere classified (M25.611)   Precautions/Allergies:   Patient has no known allergies. TREATMENT PLAN:  Effective Dates/Frequency/Duration: Twice per week from 2021 until 2021 (10 weeks). Pre-treatment Symptoms/Complaints: Pt states she felt \"good\" after session    Pain: Initial:   0/10 Post Session:  No pain verbalized   Medications Last Reviewed:  2021  Updated Objective Findings: None Today   TREATMENT:   THERAPEUTIC EXERCISE: (28 minutes):  Exercises per grid below to improve mobility and strength. Required minimal verbal cues to promote proper body alignment and promote proper body posture. Progressed resistance as indicated. Date:  2021 Date:  2021 Date:   Activity/Exercise Parameters Parameters    UBE  Level 2 resistance for 4 minutes forward and 4 minutes backward to increase shoulder mobility and blood flow    Nustep Level 2 resistance 12 mins with layered moist hot pack to the back.             Standing shoulder rows* Yellow theratubing resistance; 20 reps/1 set with cues for slow controlled movement and avoiding shoulder elevation Yellow theratubing resistance; 20 reps/1 set with cues for slow controlled movement and avoiding shoulder elevation    Standing shoulder exension* Yellow theratubing resistance; 20 reps/1 set with cues for avoiding shoulder elevation and to maintain elbow extension throughout Yellow theratubing resistance; 20 reps/1 set with cues for avoiding shoulder elevation and to maintain elbow extension throughout    Shoulder shrugs* 20 reps/1 set with cues for complete shoulder relaxation between each rep 20 reps/1 set with cues for complete shoulder relaxation between each rep    Backward shoulder circles 10 reps/1 set     Standing shoulder ER/IR* Yellow theratubing resistance; 10 reps/1 set each UE with cues to maintain elbow by side Yellow theratubing resistance; 15 reps/1 set each direction each UE with cues to maintain elbow by side    STanding protraction  10 x's      Upper trap stretch in sitting*  30 second hold x 3 reps each direction; cues to anchor arms at mat                *given in HEP  MedBridge Portal   Pt given following band colors: [x] Yellow          [] Red          [] Green          [] Blue          [] Grey     Manual therapy (12 minutes): With pt in supported supine position:   - STM and trigger point release to L then R rhomboids/middle traps, scapular musculature, and upper traps/deltoids to reduce muscular tightness and pain    - gentle inferior mobilizations to R 1st rib (grade 2-3) to reduce muscular tightness and improve mobility    Modalities (10 minutes): With pt in supported sitting position, moist heat (with 6-8 towel layers) applied to B upper traps/deltoids to reduce muscular tightness and pain. Treatment/Session Summary:    · Response to Treatment:  Patient was able to progress to more challenge with UE exercises (I.e. increased reps) this session with good technique and no reports of increased pain. She continues to have increased tightness in R>L upper traps/deltoids that reduced slightly with manual therapy. Will continue to work on reducing this tightness to improve pt's pain levels. Pt reporting slightly increased pain after prolonged sitting. No adverse reactions/unusual changes observed/reported in clinical status this session.   · Communication/Consultation:  None today  · Equipment provided today:  None today  · Recommendations/Intent for next treatment session: Next visit will focus on manual therapy/modalities to reduce muscular tightness/pain; work on gentle cervical/thoracic strengthening/stability and scapular strengthening.     Total Treatment Billable Duration:  40 minutes  PT Patient Time In/Time Out  Time In: 0956  Time Out: 12  Colten Wilcox DPT    Future Appointments   Date Time Provider Roberto Mendieta   9/20/2021  9:20 AM Hollace Lennox, NP  [address] [facility] [address] [facility]        Visit Approval Visit # Therapist initials Date A NS / Cx < 24 hr >24 hr Cx Comments    1  4/14/2021 [x]  [] [] Initial evaluation    2 PDE 4- [x] [] []     3  4/23/2021 [x] [] []     4 Laurent  4/26 [x] [] []     5  4/28/2021 [x] [] []        [] [] []        [] [] []        [] [] []        [] [] []        [] [] []        [] [] []        [] [] []        [] [] []        [] [] []        [] [] []        [] [] []        [] [] []        [] [] []

## 2021-05-03 ENCOUNTER — HOSPITAL ENCOUNTER (OUTPATIENT)
Dept: PHYSICAL THERAPY | Age: 56
Discharge: HOME | End: 2021-05-03
Attending: NURSE PRACTITIONER
Payer: COMMERCIAL

## 2021-05-03 PROCEDURE — 97110 THERAPEUTIC EXERCISES: CPT

## 2021-05-03 PROCEDURE — 97140 MANUAL THERAPY 1/> REGIONS: CPT

## 2021-05-03 NOTE — PROGRESS NOTES
Juan Chirinos  : 1965  Primary: Lory Jerome Of Alexander Sandhu*  Secondary:  [facility]rapy [facility] at [facility] [address], [address], [facility], [address] [facility]  [phone]   [phone]       OUTPATIENT PHYSICAL THERAPY: Daily Treatment Note 5/3/2021  Visit Count:  6  ICD-10: Treatment Diagnosis: Cervicalgia (M54.2)   Pain in thoracic spine (M54.6)   Stiffness of right shoulder, not elsewhere classified (M25.611)   Precautions/Allergies:   Patient has no known allergies. TREATMENT PLAN:  Effective Dates/Frequency/Duration: Twice per week from 2021 until 2021 (10 weeks). Pre-treatment Symptoms/Complaints: Pt states she's \"here\"; states no pain just stiff/sore   Pain: Initial:   0/10 Post Session:  0/10   Medications Last Reviewed:  5/3/2021  Updated Objective Findings: None Today   TREATMENT:   THERAPEUTIC EXERCISE: (26 minutes):  Exercises per grid below to improve mobility and strength. Required minimal verbal cues to promote proper body alignment and promote proper body posture. Progressed resistance as indicated. Date:  2021 Date:  2021 Date:  5/3/2021   Activity/Exercise Parameters Parameters    UBE  Level 2 resistance for 4 minutes forward and 4 minutes backward to increase shoulder mobility and blood flow Level 2 resistance for 4 minutes forward and 4 minutes backward to increase shoulder mobility and blood flow   Nustep Level 2 resistance 12 mins with layered moist hot pack to the back.             Standing shoulder rows* Yellow theratubing resistance; 20 reps/1 set with cues for slow controlled movement and avoiding shoulder elevation Yellow theratubing resistance; 20 reps/1 set with cues for slow controlled movement and avoiding shoulder elevation Green theratubing resistance; 20 reps/1 set with cues for slow controlled movement and avoiding shoulder elevation   Standing shoulder exension* Yellow theratubing resistance; 20 reps/1 set with cues for avoiding shoulder elevation and to maintain elbow extension throughout Yellow theratubing resistance; 20 reps/1 set with cues for avoiding shoulder elevation and to maintain elbow extension throughout Yellow theratubing resistance; 20 reps/1 set with cues for avoiding shoulder elevation and to maintain elbow extension throughout   Shoulder shrugs* 20 reps/1 set with cues for complete shoulder relaxation between each rep 20 reps/1 set with cues for complete shoulder relaxation between each rep    Backward shoulder circles 10 reps/1 set     Standing shoulder ER/IR* Yellow theratubing resistance; 10 reps/1 set each UE with cues to maintain elbow by side Yellow theratubing resistance; 15 reps/1 set each direction each UE with cues to maintain elbow by side Yellow theratubing resistance; 20 reps/1 set each direction each UE with cues to maintain elbow by side   STanding protraction  10 x's      Upper trap stretch in sitting*  30 second hold x 3 reps each direction; cues to anchor arms at mat                *given in HEP  MedBridge Portal   Pt given following band colors: [x] Yellow          [] Red          [] Green          [] Blue          [] Grey     Manual therapy (14 minutes): With pt in supported supine position:   - STM and trigger point release to L then R rhomboids/middle traps, scapular musculature, and upper traps/deltoids to reduce muscular tightness and pain    - gentle inferior mobilizations to R 1st rib (grade 2-3) to reduce muscular tightness and improve mobility    Modalities (10 minutes): With pt in supported sitting position, moist heat (with 6-8 towel layers) applied to B upper traps/deltoids to reduce muscular tightness and pain. Treatment/Session Summary:    · Response to Treatment:  Patient able to progress to more resistance with shoulder rows this session while maintaining good technique.  She continues to have tightness/tenderness throughout her upper traps/deltoids, but this again reduced with manual therapy. She reported feeling less tightness/pain at end of session. No adverse reactions/unusual changes observed/reported in clinical status this session. · Communication/Consultation:  None today  · Equipment provided today:  None today  · Recommendations/Intent for next treatment session: Next visit will focus on manual therapy/modalities to reduce muscular tightness/pain; work on gentle cervical/thoracic strengthening/stability and scapular strengthening.     Total Treatment Billable Duration:  40 minutes  PT Patient Time In/Time Out  Time In: 4900  Time Out: 2055 [facility]    Future Appointments   Date Time Provider Roberto Mendieta   5/5/2021  9:30 AM Gideon Busby DPT [facility]   5/10/2021  7:00 PM Gideno Busby DPT [facility]   5/12/2021 10:15 AM DAVID SousaMEHRDAD [facility]   9/20/2021  9:20 AM Shyanne Chavira NP  [address] [facility] [address] [facility]        Visit Approval Visit # Therapist initials Date A NS / Cx < 24 hr >24 hr Cx Comments    1  4/14/2021 [x]  [] [] Initial evaluation    2 PDE 4- [x] [] []     3  4/23/2021 [x] [] []     4 Laurent  4/26 [x] [] []     5  4/28/2021 [x] [] []     6  5/3/2021 [x] [] []        [] [] []        [] [] []        [] [] []        [] [] []        [] [] []        [] [] []        [] [] []        [] [] []        [] [] []        [] [] []        [] [] []        [] [] []

## 2021-05-05 ENCOUNTER — HOSPITAL ENCOUNTER (OUTPATIENT)
Dept: PHYSICAL THERAPY | Age: 56
Discharge: HOME | End: 2021-05-05
Attending: NURSE PRACTITIONER
Payer: COMMERCIAL

## 2021-05-05 PROCEDURE — 97110 THERAPEUTIC EXERCISES: CPT

## 2021-05-05 PROCEDURE — 97140 MANUAL THERAPY 1/> REGIONS: CPT

## 2021-05-05 NOTE — PROGRESS NOTES
Rosette Buchanan  : 1965  Primary: [facility]*  Secondary:  [facility], [address], [place] [address] [address] [facility]  [phone]   [phone]       OUTPATIENT PHYSICAL THERAPY: Daily Treatment Note 2021  Visit Count:  7  ICD-10: Treatment Diagnosis: Cervicalgia (M54.2)   Pain in thoracic spine (M54.6)   Stiffness of right shoulder, not elsewhere classified (M25.611)   Precautions/Allergies:   Patient has no known allergies. TREATMENT PLAN:  Effective Dates/Frequency/Duration: Twice per week from 2021 until 2021 (10 weeks). Pre-treatment Symptoms/Complaints: Pt states no pain today; states she has been feeling \"pretty good\" the past several days   Pain: Initial:   0/10 Post Session:  0/10   Medications Last Reviewed:  2021  Updated Objective Findings: None Today   TREATMENT:   THERAPEUTIC EXERCISE: (31 minutes):  Exercises per grid below to improve mobility and strength. Required minimal verbal cues to promote proper body alignment and promote proper body posture. Progressed resistance as indicated.      Date:  2021 Date:  5/3/2021 Date:  2021   Activity/Exercise Parameters     UBE Level 2 resistance for 4 minutes forward and 4 minutes backward to increase shoulder mobility and blood flow Level 2 resistance for 4 minutes forward and 4 minutes backward to increase shoulder mobility and blood flow Level 3 resistance for 4 minutes forward and 4 minutes backward to increase shoulder mobility and blood flow   Nustep      Standing shoulder rows* Yellow theratubing resistance; 20 reps/1 set with cues for slow controlled movement and avoiding shoulder elevation Green theratubing resistance; 20 reps/1 set with cues for slow controlled movement and avoiding shoulder elevation Orange theratubing resistance; 20 reps/1 set with cues for slow controlled movement and avoiding shoulder elevation   Standing shoulder exension* Yellow theratubing resistance; 20 reps/1 set with cues for avoiding shoulder elevation and to maintain elbow extension throughout Yellow theratubing resistance; 20 reps/1 set with cues for avoiding shoulder elevation and to maintain elbow extension throughout Green theratubing resistance; 20 reps/1 set with cues for avoiding shoulder elevation and to maintain elbow extension throughout   Shoulder shrugs* 20 reps/1 set with cues for complete shoulder relaxation between each rep     Backward shoulder circles      Standing shoulder ER/IR* Yellow theratubing resistance; 15 reps/1 set each direction each UE with cues to maintain elbow by side Yellow theratubing resistance; 20 reps/1 set each direction each UE with cues to maintain elbow by side    STanding protraction       Upper trap stretch in sitting* 30 second hold x 3 reps each direction; cues to anchor arms at mat     Chest press in standing   Yellow theratubing resistance; 20 reps/1 set with cues for slow controlled movement   D1 shoulder PNF in standing   Yellow theratubing resistance; 20 reps/1 set R UE in each direction   Standing bicep curls   Holding 7 weight with both hands; 20 reps/1 set with B UEs; cues to avoid elevation of shoulders               *given in HEP  MedBridge Portal   Pt given following band colors: [x] Yellow          [] Red          [] Green          [] Blue          [] Grey     Manual therapy (8 minutes): With pt in supported supine position:   - STM and trigger point release to R rhomboids/middle traps, scapular musculature, and upper traps/deltoids to reduce muscular tightness and pain    - gentle inferior mobilizations to R 1st rib (grade 2-3) to reduce muscular tightness and improve mobility    Modalities (10 minutes): With pt in supported sitting position, moist heat (with 6-8 towel layers) applied to B upper traps/deltoids to reduce muscular tightness and pain.      Treatment/Session Summary:    · Response to Treatment: Patient able to progress to more resistance with most of above listed exercises this session with no reports of increased pain. She does continue to have significant tightness in her R upper traps/deltoids, so educated pt on lifting with arms without elevating shoulders to avoid increased tightness in this muscle. She is progressing quickly toward discharge. No adverse reactions/unusual changes observed/reported in clinical status this session. · Communication/Consultation:  None today  · Equipment provided today:  None today  · Recommendations/Intent for next treatment session: Next visit will focus on manual therapy/modalities to reduce muscular tightness/pain; work on gentle cervical/thoracic strengthening/stability and scapular strengthening.     Total Treatment Billable Duration:  40 minutes  PT Patient Time In/Time Out  Time In: 0930  Time Out: [address]an Regan DPT    Future Appointments   Date Time Provider Roberto Mendieta   5/10/2021  7:00 PM Emeli Marcano DPT [facility]   5/12/2021 10:15 AM Emeli Marcano DPT [facility]   9/20/2021  9:20 AM Vinnie Rios NP  [address] [facility] [address] [facility]        Visit Approval Visit # Therapist initials Date A NS / Cx < 24 hr >24 hr Cx Comments    1  4/14/2021 [x]  [] [] Initial evaluation    2 PDE 4- [x] [] []     3  4/23/2021 [x] [] []     4 Laurent  4/26 [x] [] []     5  4/28/2021 [x] [] []     6  5/3/2021 [x] [] []     7  5/5/2021 [x] [] []        [] [] []        [] [] []        [] [] []        [] [] []        [] [] []        [] [] []        [] [] []        [] [] []        [] [] []        [] [] []        [] [] []

## 2021-05-10 ENCOUNTER — APPOINTMENT (OUTPATIENT)
Dept: PHYSICAL THERAPY | Age: 56
End: 2021-05-10
Attending: NURSE PRACTITIONER
Payer: COMMERCIAL

## 2021-05-12 ENCOUNTER — HOSPITAL ENCOUNTER (OUTPATIENT)
Dept: PHYSICAL THERAPY | Age: 56
Discharge: HOME | End: 2021-05-12
Attending: NURSE PRACTITIONER
Payer: COMMERCIAL

## 2021-05-12 PROCEDURE — 97110 THERAPEUTIC EXERCISES: CPT

## 2021-05-12 PROCEDURE — 97140 MANUAL THERAPY 1/> REGIONS: CPT

## 2021-05-12 NOTE — PROGRESS NOTES
Christie Finney  : 1965  Primary: [facility]  Secondary:  [facility] at [facility], [address] [facility], [address]  [phone]   [phone]       OUTPATIENT PHYSICAL THERAPY: Daily Treatment Note 2021  Visit Count:  8  ICD-10: Treatment Diagnosis: Cervicalgia (M54.2)   Pain in thoracic spine (M54.6)   Stiffness of right shoulder, not elsewhere classified (M25.611)   Precautions/Allergies:   Patient has no known allergies. TREATMENT PLAN:  Effective Dates/Frequency/Duration: Twice per week from 2021 until 2021 (10 weeks). Pre-treatment Symptoms/Complaints: Pt states she was hurting after the exercises last week; states she will have to start additional therapy next week (vs this week)  Pain: Initial:   5/10 Post Session:  6/10   Medications Last Reviewed:  2021  Updated Objective Findings: See evaluation note from today   TREATMENT:   THERAPEUTIC EXERCISE: (29 minutes):  Exercises per grid below (and assessment in chart on 2021) to improve mobility and strength. Required minimal verbal cues to promote proper body alignment and promote proper body posture. Progressed resistance as indicated.        Date:  5/3/2021 Date:  2021 Date:  2021   Activity/Exercise      UBE Level 2 resistance for 4 minutes forward and 4 minutes backward to increase shoulder mobility and blood flow Level 3 resistance for 4 minutes forward and 4 minutes backward to increase shoulder mobility and blood flow Level 4 resistance for 4 minutes forward and 4 minutes backward to increase shoulder mobility and blood flow   Nustep      Standing shoulder rows* Green theratubing resistance; 20 reps/1 set with cues for slow controlled movement and avoiding shoulder elevation Orange theratubing resistance; 20 reps/1 set with cues for slow controlled movement and avoiding shoulder elevation Orange theratubing resistance; 20 reps/1 set with cues for slow controlled movement and avoiding shoulder elevation   Standing shoulder exension* Yellow theratubing resistance; 20 reps/1 set with cues for avoiding shoulder elevation and to maintain elbow extension throughout Green theratubing resistance; 20 reps/1 set with cues for avoiding shoulder elevation and to maintain elbow extension throughout Green theratubing resistance; 20 reps/1 set with cues for avoiding shoulder elevation and to maintain elbow extension throughout   Shoulder shrugs*      Backward shoulder circles      Standing shoulder ER/IR* Yellow theratubing resistance; 20 reps/1 set each direction each UE with cues to maintain elbow by side     STanding protraction       Upper trap stretch in sitting*      Chest press in standing  Yellow theratubing resistance; 20 reps/1 set with cues for slow controlled movement    D1 shoulder PNF in standing  Yellow theratubing resistance; 20 reps/1 set R UE in each direction    Standing bicep curls  Holding 7 weight with both hands; 20 reps/1 set with B UEs; cues to avoid elevation of shoulders    Cervical/thoracic AROM   Per ROM assessment in chart   Varying resistance at each UE in multiple different planes of motion   Per strength assessment in chart   Chest press in supine   Holding 3 lb dumbbells; 20 reps/1 set with cues for correct technique and slow lowering   *given in HEP  MedBridge Portal   Pt given following band colors: [x] Yellow          [] Red          [] Green          [] Blue          [] Grey     Manual therapy (10 minutes): With pt in supported supine position:   - STM and trigger point release to R rhomboids/middle traps, scapular musculature, and upper traps/deltoids to reduce muscular tightness and pain     Modalities (10 minutes): With pt in supported sitting position, moist heat (with 6-8 towel layers) applied to B upper traps/deltoids to reduce muscular tightness and pain.      Treatment/Session Summary:    · Response to Treatment: Progress note today - see assessment in chart. No adverse reactions/unusual changes observed/reported in clinical status this session. · Communication/Consultation:  None today  · Equipment provided today:  None today  · Recommendations/Intent for next treatment session: Next visit will focus on manual therapy/modalities to reduce muscular tightness/pain; work on gentle cervical/thoracic strengthening/stability and scapular strengthening.     Total Treatment Billable Duration:  39 minutes  PT Patient Time In/Time Out  Time In: 1016  Time Out: 44 Sophie Barry DPT    Future Appointments   Date Time Provider Roberto Mendieta   5/17/2021 10:15 AM Jaziel Willis PTA [facility]   5/20/2021 10:15 AM Christy Hyatt, DPMEHRDAD SFDORPMEHRDAD [facility]   5/24/2021 10:15 AM Christy Sour, DPT [facility]ORPT [facility]   5/27/2021 10:15 AM Christy Sour, DPT SFDORPT [facility]   9/20/2021  9:20 AM Liz Castanon NP [facility]        Visit Approval Visit # Therapist initials Date A NS / Cx < 24 hr >24 hr Cx Comments    1  4/14/2021 [x]  [] [] Initial evaluation    2 PDE 4- [x] [] []     3  4/23/2021 [x] [] []     4 Laurent  4/26 [x] [] []     5  4/28/2021 [x] [] []     6  5/3/2021 [x] [] []     7  5/5/2021 [x] [] []     8  5/12/2021 [x] [] [] Progress note       [] [] []        [] [] []        [] [] []        [] [] []        [] [] []        [] [] []        [] [] []        [] [] []        [] [] []        [] [] []

## 2021-05-12 NOTE — PROGRESS NOTES
Hansel Huang  : 1965  Primary: [facility]  Secondary:  Therapy Center at [address] [place] [address] [address], [address] [facility], [place] [address] [address] [facility]  [phone]   [phone]        OUTPATIENT PHYSICAL THERAPY:Progress Report 2021   ICD-10: Treatment Diagnosis: Cervicalgia (M54.2)   Pain in thoracic spine (M54.6)   Stiffness of right shoulder, not elsewhere classified (M25.611)   Precautions/Allergies:   Patient has no known allergies. TREATMENT PLAN:  Effective Dates/Frequency/Duration: Twice per week from 2021 until 2021 (10 weeks). MEDICAL/REFERRING DIAGNOSIS:  Pain in thoracic spine [M54.6]  Other chronic pain [G89.29]   DATE OF ONSET: 1 year ago (acute exacerbation of chronic pain)  REFERRING PHYSICIAN: Lien Mejia NP MD Orders: Evaluate and treat  Return MD Appointment: unspecified     ASSESSMENT:  Hansel Huang has now attended 8 physical therapy sessions who presents to physical therapy for acute exacerbation of chronic mid back pain. This session, pt demonstrated improved B UE strength/endurance, slightly improved R shoulder IR AROM and cervical/thoracic mobility, and slightly improved upright posture. Despite these improvements, she continues to demonstrate decreased B UE strength/endurance, decreased right shoulder and thoracic mobility with associated pain, multiple postural deficits, decreased sitting tolerance and decreased functional mobility. Pt had been making significant improvements in pain and mobility in last few weeks, but had increased pain after last session secondary to more challenging exercises being performed - this may be the reason that her outcome measure scores have not changed. Pt may continue to benefit from skilled PT to address the above listed deficits to improve ability to perform pain-free ADLs/IADLs and to improve overall quality of life prior to discharge.    PROBLEM LIST (Impacting functional limitations):  1. Decreased Strength  2. Decreased ADL/Functional Activities  3. Increased Pain  4. Decreased Activity Tolerance  5. Decreased Work Simplification/Energy Conservation Techniques  6. Increased Fatigue  7. Decreased Flexibility/Joint Mobility  8. Edema/Girth INTERVENTIONS PLANNED: (Treatment may consist of any combination of the following)  1. Bed Mobility  2. Cold  3. Electrical Stimulation  4. Heat  5. Home Exercise Program (HEP)  6. Manual Therapy  7. Neuromuscular Re-education/Strengthening  8. Range of Motion (ROM)  9. Therapeutic Activites  10. Therapeutic Exercise/Strengthening  11. Transcutaneous Electrical Nerve Stimulation (TENS)  12. Ultrasound (US)     GOALS: (Goals have been discussed and agreed upon with patient.)  Short-Term Functional Goals: Time Frame: 5 weeks  1. Pt will be compliant with HEP in order to increase cervical mobility and UE strength/endurance/mobility to improve functional mobility and overall quality of life. (PROGRESSING, 5/12/2021)   2. Pt will improve score on the Disabilities of the Arm, Shoulder, and Hand (DASH) Questionnaire to 14/55 in order to improve independence with ADLs and IADLs and to improve overall quality of life. (CONTINUE, 5/12/2021)   3. Pt will reduce score on Neck Disability Index to 0/50 in order to demonstrate improved function during activities of daily living. (CONTINUE, 5/12/2021)   4. Pt will increase right shoulder internal rotation AROM to level of T10 with minimal to no increase in pain in order to improve functional mobility and ability to wash her back. (MET, 5/12/2021)   5. Pt will be able to demonstrate good body mechanics while lifting 5 lb object up from the floor in order to minimize pain while lifting parts at her job. (MET, 5/12/2021)   Discharge Goals: Time Frame: 10 weeks  1.  Pt will be independent with HEP in order to increase cervical mobility and UE strength/endurance/mobility to improve functional mobility and overall quality of life. 2. Pt will improve score on the Disabilities of the Arm, Shoulder, and Hand (DASH) Questionnaire to 11/55 in order to improve independence with ADLs and IADLs and to improve overall quality of life. 3. Pt will increase right shoulder internal rotation AROM to level of T8 with minimal to no increase in pain in order to improve functional mobility and ability to wash her back. 4. Pt will be able to demonstrate good body mechanics while lifting 15 lb object up from the floor in order to minimize pain while lifting parts at her job. OUTCOME MEASURE:   Tool Used: Disabilities of the Arm, Shoulder and Hand (DASH) Questionnaire - Quick Version  Score:  Initial: 18/55  Most Recent: 18/55 (Date: 5/12/2021 )   Interpretation of Score: The DASH is designed to measure the activities of daily living in person's with upper extremity dysfunction or pain. Each section is scored on a 1-5 scale, 5 representing the greatest disability. The scores of each section are added together for a total score of 55. Tool Used: Neck Disability Index (NDI)  Score:  Initial: 1/50  Most Recent: 1/50 (Date: 5/12/2021 )   Interpretation of Score: The Neck Disability Index is a revised form of the Oswestry Low Back Pain Index and is designed to measure the activities of daily living in person's with neck pain. Each section is scored on a 0-5 scale, 5 representing the greatest disability. The scores of each section are added together for a total score of 50. FALL RISK:    Ambulatory/Rehab Services H2 Model Falls Risk Assessment   Risk Factors:       No Risk Factors Identified Ability to Rise from Chair:       (0)  Ability to rise in a single movement   Falls Prevention Plan:       No modifications necessary   Total: (5 or greater = High Risk): 0   ©2010 [facility] [employer]. All Rights Reserved. [facility] Patent #[id number].  Federal Law prohibits the replication, distribution or use without written permission from [facility]       UPDATED OBJECTIVE FINDINGS:     Reassessment on 5/12/2021   Observation/Orthostatic Postural Assessment:    The following postural deficits were noted in sitting: loss of cervical lordosis, increased thoracic kyphosis, forward head, rounded shoulders and posterior pelvic tilt  - slightly improved upright posture in sitting on 5/12/2021  The following postural deficits were noted in standing: excessive lumbar lordosis; pt overweight  Palpation:          Central and unilateral (each side) PA mobilizations at C6-T7 painful and stiff; central PA mobilizations at lumbar and cervical spine slightly stiff but not tender  ROM:    Initial measurement: (on 4/14/2021) Initial measurement: (on 4/14/2021) Reassessment measurement: (on 5/12/2021) Reassessment measurement:      WFL and non-painful throughout L shoulder, elbow, and hand  L shoulder IR AROM to level of T8 WFL throughout R shoulder, elbow and wrist, but limited with following motions: R shoulder IR AROM limited to level of T11   R shoulder IR AROM limited to level of T10      Initial measurement: (on 4/14/2021) Reassessment measurement: (on 5/12/2021) Reassessment measurement:      Cervical AROM  Flexion (% of normal): 100%  Extension (% of normal): 100%  L sidebending (% of normal): 100%  R sidebending (% of normal): 100%  L rotation (% of normal): 90%  R rotation (% of normal): 100%    Thoracic AROM  Flexion (% of normal): 100% (increased tightness)  Extension (% of normal): 80%  L sidebending (% of normal): 100% (increased tightness)  R sidebending (% of normal): 100% (increased tightness)  L rotation (% of normal): 100%  R rotation (% of normal): 100% Cervical AROM  Flexion (% of normal): 100%  Extension (% of normal): 100%  L sidebending (% of normal): 100%  R sidebending (% of normal): 100%  L rotation (% of normal): 95%  R rotation (% of normal): 100%    Thoracic AROM  Flexion (% of normal): 100% (increased tightness)  Extension (% of normal): 90%  L sidebending (% of normal): 100%  R sidebending (% of normal): 100%   L rotation (% of normal): 100%  R rotation (% of normal): 100%           Strength:    Initial measurement: (on 4/14/2021) Initial measurement: (on 4/14/2021)  Reassessment  (on 5/12/2021) Reassessment (on 5/12/2021)    L UE: R UE: L UE: R UE:   Shoulder flexion  4/5  4+/5  4+/5 4/5   Shoulder extension 5/5  5/5  5/5 5/5   Shoulder abduction 4/5  4/5  4+/5 4/5   Shoulder adduction 4+/5  4+/5  5/5 5/5   Shoulder IR  5/5  5/5  5/5 5/5   Shoulder ER 4+/5  4/5  4+/5 4+/5   Elbow flexion 5/5  5/5  5/5 5/5   Elbow extension 5/5  5/5  5/5 5/5   Wrist flexion  5/5  5/5  5/5 5/5   Wrist extension 5/5  5/5  5/5 5/5      Special Tests:          Cervical compression/distraction: - for changes  Spurlings: - for changes  Empty can: + on R for increased weakness and pain with IR    MEDICAL NECESSITY:   · Patient is expected to demonstrate progress in strength, range of motion and functional technique to improve ability to perform pain-free ADLs/IADLs and to improve overall quality of life. REASON FOR SERVICES/OTHER COMMENTS:  · Patient continues to require modification of therapeutic interventions to increase complexity of exercises. Rehabilitation Potential For Stated Goals: Good  Regarding Juanita Roe's therapy, I certify that the treatment plan above will be carried out by a therapist or under their direction. Thank you for this referral,  Ryan Mcdowell DPT     Referring Physician Signature: Zeferino Pro NP No Signature is Required for this note.

## 2021-05-17 ENCOUNTER — HOSPITAL ENCOUNTER (OUTPATIENT)
Dept: PHYSICAL THERAPY | Age: 56
Discharge: HOME | End: 2021-05-17
Attending: NURSE PRACTITIONER
Payer: COMMERCIAL

## 2021-05-17 PROCEDURE — 97140 MANUAL THERAPY 1/> REGIONS: CPT

## 2021-05-17 PROCEDURE — 97110 THERAPEUTIC EXERCISES: CPT

## 2021-05-17 NOTE — PROGRESS NOTES
Meseret Groverigham  : 1965  Primary: Jayla Corolla Of Alexander Sandhu*  Secondary:  Therapy Center at [address] [facility] [address], [address] [facility], [place] [address] [address] [facility]  [phone]   [phone]       OUTPATIENT PHYSICAL THERAPY: Daily Treatment Note 2021  Visit Count:  9  ICD-10: Treatment Diagnosis: Cervicalgia (M54.2)   Pain in thoracic spine (M54.6)   Stiffness of right shoulder, not elsewhere classified (M25.611)   Precautions/Allergies:   Patient has no known allergies. TREATMENT PLAN:  Effective Dates/Frequency/Duration: Twice per week from 2021 until 2021 (10 weeks). Pre-treatment Symptoms/Complaints: Pt states she had a good weekend. She reports just rested. Pain: Initial:   4/10 Post Session: 3/10   Medications Last Reviewed:  2021  Updated Objective Findings: See evaluation note from today   TREATMENT:   THERAPEUTIC EXERCISE: (30 minutes):  Exercises per grid below (and assessment in chart on 2021) to improve mobility and strength. Required minimal verbal cues to promote proper body alignment and promote proper body posture. Progressed resistance as indicated. Date:  5/3/2021 Date:  2021 Date:  2021 Date:  2021   Activity/Exercise       UBE Level 2 resistance for 4 minutes forward and 4 minutes backward to increase shoulder mobility and blood flow Level 3 resistance for 4 minutes forward and 4 minutes backward to increase shoulder mobility and blood flow Level 4 resistance for 4 minutes forward and 4 minutes backward to increase shoulder mobility and blood flow Level 4   / to increase shoulder mobility and blood flow.     Nustep       Standing shoulder rows* Green theratubing resistance; 20 reps/1 set with cues for slow controlled movement and avoiding shoulder elevation Orange theratubing resistance; 20 reps/1 set with cues for slow controlled movement and avoiding shoulder elevation Orange theratubing resistance; 20 reps/1 set with cues for slow controlled movement and avoiding shoulder elevation Orange tubing   X 20 with cues for slow and controlled movements    Standing shoulder exension* Yellow theratubing resistance; 20 reps/1 set with cues for avoiding shoulder elevation and to maintain elbow extension throughout Green theratubing resistance; 20 reps/1 set with cues for avoiding shoulder elevation and to maintain elbow extension throughout Green theratubing resistance; 20 reps/1 set with cues for avoiding shoulder elevation and to maintain elbow extension throughout Green tubing   X 20 with cues for slow and controled movements    Shoulder shrugs*       Backward shoulder circles       Standing shoulder ER/IR* Yellow theratubing resistance; 20 reps/1 set each direction each UE with cues to maintain elbow by side   Yellow tubing   X 20 B each direction with slow and controlled movements    STanding protraction        Upper trap stretch in sitting*       Chest press in standing  Yellow theratubing resistance; 20 reps/1 set with cues for slow controlled movement  Yellow   X 20 with cues for slow and controlled movements    D1 shoulder PNF in standing  Yellow theratubing resistance; 20 reps/1 set R UE in each direction     Standing bicep curls  Holding 7 weight with both hands; 20 reps/1 set with B UEs; cues to avoid elevation of shoulders     Cervical/thoracic AROM   Per ROM assessment in chart    Varying resistance at each UE in multiple different planes of motion   Per strength assessment in chart    Chest press in supine   Holding 3 lb dumbbells; 20 reps/1 set with cues for correct technique and slow lowering    *given in HEP  MedBridge Portal   Pt given following band colors: [x] Yellow          [] Red          [] Green          [] Blue          [] Grey     Manual therapy (15 minutes):    With pt in supported prone position:   - STM and trigger point release to R rhomboids/middle traps, scapular musculature, and upper traps/deltoids to reduce muscular tightness and pain     Modalities (10 minutes): With pt in supported sitting position, moist heat (with 6-8 towel layers) applied to B upper traps/deltoids to reduce muscular tightness and pain. Skin clear and intact. Treatment/Session Summary:    · Response to Treatment:  Patient        No adverse reactions/unusual changes observed/reported in clinical status this session. · Communication/Consultation:  None today  · Equipment provided today:  None today  · Recommendations/Intent for next treatment session: Next visit will focus on manual therapy/modalities to reduce muscular tightness/pain; work on gentle cervical/thoracic strengthening/stability and scapular strengthening.     Total Treatment Billable Duration:  45 minutes + 10 Minutes   PT Patient Time In/Time Out  Time In: 3490  Time Out: 200 [place], [place]    Future Appointments   Date Time Provider Roberto Mendieta   5/20/2021 10:15 AM Rita Hyatt DPT [facility]   [facility]24[facility]2021 10:[facility]   [facility]27[facility]2021 10:15 [facility]   9/20/2021  9:20 AM Macario Weber NP [facility]        Visit Approval Visit # Therapist initials Date A NS / Cx < 24 hr >24 hr Cx Comments    1  4/14/2021 [x]  [] [] Initial evaluation    2 PDE 4- [x] [] []     3  4/23/2021 [x] [] []     4 Laurent  4/26 [x] [] []     5  4/28/2021 [x] [] []     6  5/3/2021 [x] [] []     7  5/5/2021 [x] [] []     8  5/12/2021 [x] [] [] Progress note    9 PDE 5- [x] [] []        [] [] []        [] [] []        [] [] []        [] [] []        [] [] []        [] [] []        [] [] []        [] [] []        [] [] []

## 2021-05-20 ENCOUNTER — HOSPITAL ENCOUNTER (OUTPATIENT)
Dept: PHYSICAL THERAPY | Age: 56
Discharge: HOME | End: 2021-05-20
Attending: NURSE PRACTITIONER
Payer: COMMERCIAL

## 2021-05-20 PROCEDURE — 97140 MANUAL THERAPY 1/> REGIONS: CPT

## 2021-05-20 PROCEDURE — 97110 THERAPEUTIC EXERCISES: CPT

## 2021-05-20 NOTE — PROGRESS NOTES
Antolin Her  : 1965  Primary: Orlando Askew Of [facility] Michelinekiki*  Secondary:  Therapy Center at [address] [place] [address] [address], [address] [facility], [place], [facility] [address] [facility]  [phone]   [phone]       OUTPATIENT PHYSICAL THERAPY: Daily Treatment Note 2021  Visit Count:  10  ICD-10: Treatment Diagnosis: Cervicalgia (M54.2)   Pain in thoracic spine (M54.6)   Stiffness of right shoulder, not elsewhere classified (M25.611)   Precautions/Allergies:   Patient has no known allergies. TREATMENT PLAN:  Effective Dates/Frequency/Duration: Twice per week from 2021 until 2021 (10 weeks). Pre-treatment Symptoms/Complaints: Pt states she doesn't have a lot of pain but does have a lot of soreness. States she feels slightly better overall. Pain: Initial:   4/10 Post Session: 3/10   Medications Last Reviewed:  2021  Updated Objective Findings: None Today   TREATMENT:   THERAPEUTIC EXERCISE: (25 minutes):  Exercises per grid below to improve mobility and strength. Required minimal verbal cues to promote proper body alignment and promote proper body posture. Progressed resistance as indicated. Date:  5/3/2021 Date:  2021 Date:  2021 Date:  2021 Date:  2021   Activity/Exercise        UBE Level 2 resistance for 4 minutes forward and 4 minutes backward to increase shoulder mobility and blood flow Level 3 resistance for 4 minutes forward and 4 minutes backward to increase shoulder mobility and blood flow Level 4 resistance for 4 minutes forward and 4 minutes backward to increase shoulder mobility and blood flow Level 4   5/5 to increase shoulder mobility and blood flow.   Level 4 resistance for 4 minutes forward and 4 minutes backward to increase shoulder mobility and blood flow   Nustep        Standing shoulder rows* Green theratubing resistance; 20 reps/1 set with cues for slow controlled movement and avoiding shoulder elevation Orange theratubing resistance; 20 reps/1 set with cues for slow controlled movement and avoiding shoulder elevation Orange theratubing resistance; 20 reps/1 set with cues for slow controlled movement and avoiding shoulder elevation Orange tubing   X 20 with cues for slow and controlled movements  Orange theratubing resistance; 20 reps/1 set with cues for slow controlled movement and avoiding shoulder elevation   Standing shoulder exension* Yellow theratubing resistance; 20 reps/1 set with cues for avoiding shoulder elevation and to maintain elbow extension throughout Green theratubing resistance; 20 reps/1 set with cues for avoiding shoulder elevation and to maintain elbow extension throughout Green theratubing resistance; 20 reps/1 set with cues for avoiding shoulder elevation and to maintain elbow extension throughout Green tubing   X 20 with cues for slow and controled movements  Green theratubing resistance; 20 reps/1 set with cues for avoiding shoulder elevation and to maintain elbow extension throughout   Shoulder shrugs*        Backward shoulder circles        Standing shoulder ER/IR* Yellow theratubing resistance; 20 reps/1 set each direction each UE with cues to maintain elbow by side   Yellow tubing   X 20 B each direction with slow and controlled movements  Yellow tubing   X 20 B each direction with slow and controlled movements    STanding protraction         Upper trap stretch in sitting*        Chest press in standing  Yellow theratubing resistance; 20 reps/1 set with cues for slow controlled movement  Yellow   X 20 with cues for slow and controlled movements     D1 shoulder PNF in standing  Yellow theratubing resistance; 20 reps/1 set R UE in each direction      Standing bicep curls  Holding 7 weight with both hands; 20 reps/1 set with B UEs; cues to avoid elevation of shoulders      Cervical/thoracic AROM   Per ROM assessment in chart     Varying resistance at each UE in multiple different planes of motion   Per strength assessment in chart     Chest press in supine   Holding 3 lb dumbbells; 20 reps/1 set with cues for correct technique and slow lowering     *given in HEP  MedBridge Portal   Pt given following band colors: [x] Yellow          [] Red          [] Green          [] Blue          [] Grey     Manual therapy (15 minutes): With pt in supported supine position:   - STM and trigger point release to B rhomboids/middle traps, levator scapulae, and upper traps/deltoids to reduce muscular tightness and pain     Modalities (10 minutes): With pt in supported sitting position, moist heat (with 6-8 towel layers) applied to B upper traps/deltoids to reduce muscular tightness and pain. Skin clear and intact. Treatment/Session Summary:    · Response to Treatment:  Patient demonstrating improved postural control and technique with strengthening exercises this session. She still requires occasional cues to avoid shoulder elevation at rest - instructed her to try to be more aware of her posture during the following week to try to reduce tightness. Pt verbalized understanding. No adverse reactions/unusual changes observed/reported in clinical status this session. · Communication/Consultation:  None today  · Equipment provided today:  None today  · Recommendations/Intent for next treatment session: Next visit will focus on manual therapy/modalities to reduce muscular tightness/pain; work on gentle cervical/thoracic strengthening/stability and scapular strengthening.     Total Treatment Billable Duration:  40 Minutes   PT Patient Time In/Time Out  Time In: 1020  Time Out: RJ/Real Galvan DPT    Future Appointments   Date Time Provider Roberto Mendieta   5/20/2021 11:20 AM Judge Pauly NP [facility]   5/24/2021 10:15 [facility]   5/27/2021 10:15 AM Ketty Swanson DPT [facility]   9/20/2021  9:20 AM Judge Pauly NP [facility]        Visit Approval Visit # Therapist initials Date A NS / Cx < 24 hr >24 hr Cx Comments    1  4/14/2021 [x]  [] [] Initial evaluation    2 PDE 4- [x] [] []     3  4/23/2021 [x] [] []     4 Laurent  4/26 [x] [] []     5  4/28/2021 [x] [] []     6  5/3/2021 [x] [] []     7  5/5/2021 [x] [] []     8 [facility] 5/12/2021 [x] [] [] Progress note    9 PDE 5- [x] [] []     10  5/20/2021 [x] [] []        [] [] []        [] [] []        [] [] []        [] [] []        [] [] []        [] [] []        [] [] []        [] [] []

## 2021-05-24 ENCOUNTER — HOSPITAL ENCOUNTER (OUTPATIENT)
Dept: PHYSICAL THERAPY | Age: 56
Discharge: HOME | End: 2021-05-24
Attending: NURSE PRACTITIONER
Payer: COMMERCIAL

## 2021-05-24 PROCEDURE — 97110 THERAPEUTIC EXERCISES: CPT

## 2021-05-24 NOTE — PROGRESS NOTES
Jamaal Bowers  : 1965  Primary: Florentino Crew Of [facility] Sandhu*  Secondary:  Therapy Center at [facility] [address], [address] [facility], [place] [address] [address] [facility]  [phone]   [phone]       OUTPATIENT PHYSICAL THERAPY: Daily Treatment Note 2021  Visit Count:  11  ICD-10: Treatment Diagnosis: Cervicalgia (M54.2)   Pain in thoracic spine (M54.6)   Stiffness of right shoulder, not elsewhere classified (M25.611)   Precautions/Allergies:   Patient has no known allergies. TREATMENT PLAN:  Effective Dates/Frequency/Duration: Twice per week from 2021 until 2021 (10 weeks). Pre-treatment Symptoms/Complaints: Pt states she doesn't have any pain today. States she still has some soreness. States she no longer has the spasms when she is lying down  Pain: Initial:   0/10 Post Session: 0/10   Medications Last Reviewed:  2021  Updated Objective Findings: None Today   TREATMENT:   THERAPEUTIC EXERCISE: (40 minutes):  Exercises per grid below to improve mobility and strength. Required minimal verbal cues to promote proper body alignment and promote proper body posture. Progressed resistance as indicated. Date:  2021 Date:  2021 Date:  2021 Date:  2021 Date:  2021   Activity/Exercise        UBE Level 3 resistance for 4 minutes forward and 4 minutes backward to increase shoulder mobility and blood flow Level 4 resistance for 4 minutes forward and 4 minutes backward to increase shoulder mobility and blood flow Level 4   5/5 to increase shoulder mobility and blood flow.   Level 4 resistance for 4 minutes forward and 4 minutes backward to increase shoulder mobility and blood flow Level 4 resistance for 5 minutes forward and 5 minutes backward to increase shoulder mobility and blood flow   Nustep        Standing shoulder rows* Orange theratubing resistance; 20 reps/1 set with cues for slow controlled movement and avoiding shoulder elevation Orange theratubing resistance; 20 reps/1 set with cues for slow controlled movement and avoiding shoulder elevation Orange tubing   X 20 with cues for slow and controlled movements  Orange theratubing resistance; 20 reps/1 set with cues for slow controlled movement and avoiding shoulder elevation Orange theratubing resistance; 20 reps/1 set with cues for slow controlled movement and avoiding shoulder elevation   Standing shoulder exension* Green theratubing resistance; 20 reps/1 set with cues for avoiding shoulder elevation and to maintain elbow extension throughout Green theratubing resistance; 20 reps/1 set with cues for avoiding shoulder elevation and to maintain elbow extension throughout Green tubing   X 20 with cues for slow and controled movements  Green theratubing resistance; 20 reps/1 set with cues for avoiding shoulder elevation and to maintain elbow extension throughout Green progressing to orange theratubing resistance; 20 reps/1 set with cues for avoiding shoulder elevation and to maintain elbow extension throughout   Shoulder shrugs*        Backward shoulder circles        Standing shoulder ER/IR*   Yellow tubing   X 20 B each direction with slow and controlled movements  Yellow tubing   X 20 B each direction with slow and controlled movements  Yellow tubing   X 20 B each direction with slow and controlled movements   STanding protraction         Upper trap stretch in sitting*        Chest press in standing Yellow theratubing resistance; 20 reps/1 set with cues for slow controlled movement  Yellow   X 20 with cues for slow and controlled movements      D1 shoulder PNF in standing Yellow theratubing resistance; 20 reps/1 set R UE in each direction       Standing bicep curls Holding 7 weight with both hands; 20 reps/1 set with B UEs; cues to avoid elevation of shoulders       Cervical/thoracic AROM  Per ROM assessment in chart      Varying resistance at each UE in multiple different planes of motion Per strength assessment in chart      Chest press in supine  Holding 3 lb dumbbells; 20 reps/1 set with cues for correct technique and slow lowering      PNF shoulder exercises in standing     D1 flexion then extension with yellow theratubing resistance; 20 reps/1 set each UE each direction                   *given in HEP  MedBridge Portal   Pt given following band colors: [x] Yellow          [] Red          [] Green          [] Blue          [] Grey     Manual therapy     Modalities (10 minutes): With pt in supported sitting position, moist heat (with 6-8 towel layers) applied to B upper traps/deltoids to reduce muscular tightness and pain. Skin clear and intact. Treatment/Session Summary:    · Response to Treatment:  Patient demonstrating good technique with even more challenging UE strengthening exercises this session with no reports of increased pain. She is stating no pain in general, so may be close to discharge within next few sessions. No adverse reactions/unusual changes observed/reported in clinical status this session. · Communication/Consultation:  None today  · Equipment provided today:  None today  · Recommendations/Intent for next treatment session: Next visit will focus on manual therapy/modalities to reduce muscular tightness/pain; work on gentle cervical/thoracic strengthening/stability and scapular strengthening.     Total Treatment Billable Duration:  40 Minutes   PT Patient Time In/Time Out  Time In: 9115  Time Out: 44 Sophie Barry DPT    Future Appointments   Date Time Provider Roberto Mendieta   5/27/2021 10:15 AM Michelle Diamond DPT [facility]   6/2/2021 10:15 AM DAVID Calderon [facility]   6/4/2021  9:30 AM Latina Hodgkin, PTA [facility]   9/20/2021  9:20 AM Jevon Joseph NP [facility]        Visit Approval Visit # Therapist initials Date A NS / Cx < 24 hr >24 hr Cx Comments    1  4/14/2021 [x]  [] [] Initial evaluation    2 PDE 4- [x] [] [] 3  4/23/2021 [x] [] []     4 Laurent  4/26 [x] [] []     5 DBA 4/28/2021 [x] [] []     6  5/3/2021 [x] [] []     7 DBA 5/5/2021 [x] [] []     8 DBA 5/12/2021 [x] [] [] Progress note    9 [facility] 5- [x] [] []     10 DBA 5/20/2021 [x] [] []     11 DBA 5/24/2021 [x] [] []        [] [] []        [] [] []        [] [] []        [] [] []        [] [] []        [] [] []        [] [] []

## 2021-05-26 NOTE — PROGRESS NOTES
I am accessing Ms. Roe's chart as a part of our department's internal chart auditing process. I certify that Ms. Samano is, or was, a patient in our department.   Thank you,  Jordyn Pickard, PT, DPT  5/26/2021 normal...

## 2021-05-27 ENCOUNTER — HOSPITAL ENCOUNTER (OUTPATIENT)
Dept: PHYSICAL THERAPY | Age: 56
Discharge: HOME | End: 2021-05-27
Attending: NURSE PRACTITIONER
Payer: COMMERCIAL

## 2021-05-27 PROCEDURE — 97110 THERAPEUTIC EXERCISES: CPT

## 2021-05-27 NOTE — PROGRESS NOTES
Ninomagda Hugh  : 1965  Primary: Oliva Garg Of [facility] [place]*  Secondary:  [facility], [facility], [address]  [phone]   [phone]       OUTPATIENT PHYSICAL THERAPY: Daily Treatment Note 2021  Visit Count:  12  ICD-10: Treatment Diagnosis: Cervicalgia (M54.2)   Pain in thoracic spine (M54.6)   Stiffness of right shoulder, not elsewhere classified (M25.611)   Precautions/Allergies:   Patient has no known allergies. TREATMENT PLAN:  Effective Dates/Frequency/Duration: Twice per week from 2021 until 2021 (10 weeks). Pre-treatment Symptoms/Complaints: Pt states she doesn't have any pain today. States she still has some soreness. States she no longer has the spasms when she is lying down  Pain: Initial:   0/10 Post Session: 0/10   Medications Last Reviewed:  2021  Updated Objective Findings: None Today   TREATMENT:   THERAPEUTIC EXERCISE: (40 minutes):  Exercises per grid below to improve mobility and strength. Required minimal verbal cues to promote proper body alignment and promote proper body posture. Progressed resistance as indicated. Date:  2021 Date:  2021 Date:  2021 Date:  2021 Date:  2021   Activity/Exercise        UBE Level 4 resistance for 4 minutes forward and 4 minutes backward to increase shoulder mobility and blood flow Level 4   5/5 to increase shoulder mobility and blood flow.   Level 4 resistance for 4 minutes forward and 4 minutes backward to increase shoulder mobility and blood flow Level 4 resistance for 5 minutes forward and 5 minutes backward to increase shoulder mobility and blood flow Level 4 resistance for 5 minutes forward and 5 minutes backward to increase shoulder mobility and blood flow   Nustep        Standing shoulder rows* Orange theratubing resistance; 20 reps/1 set with cues for slow controlled movement and avoiding shoulder elevation Orange tubing   X 20 with cues for slow and controlled movements  Orange theratubing resistance; 20 reps/1 set with cues for slow controlled movement and avoiding shoulder elevation Orange theratubing resistance; 20 reps/1 set with cues for slow controlled movement and avoiding shoulder elevation Orange theratubing resistance; 20 reps/1 set with cues for slow controlled movement and avoiding shoulder elevation   Standing shoulder exension* Green theratubing resistance; 20 reps/1 set with cues for avoiding shoulder elevation and to maintain elbow extension throughout Green tubing   X 20 with cues for slow and controled movements  Green theratubing resistance; 20 reps/1 set with cues for avoiding shoulder elevation and to maintain elbow extension throughout Green progressing to orange theratubing resistance; 20 reps/1 set with cues for avoiding shoulder elevation and to maintain elbow extension throughout Orange theratubing resistance; 20 reps/1 set with cues for avoiding shoulder elevation and to maintain elbow extension throughout   Shoulder shrugs*        Backward shoulder circles        Standing shoulder ER/IR*  Yellow tubing   X 20 B each direction with slow and controlled movements  Yellow tubing   X 20 B each direction with slow and controlled movements  Yellow tubing   X 20 B each direction with slow and controlled movements Yellow tubing   X 20 B each direction with slow and controlled movements   STanding protraction         Upper trap stretch in sitting*        Chest press in standing  Yellow   X 20 with cues for slow and controlled movements    Yellow tubing resistance; 20 reps/1 set with cues for slow controlled movements   Standing bicep curls     3 lb dumbbells; 20 reps/1 set B UEs with cues for slow lowering   Cervical/thoracic AROM Per ROM assessment in chart       Varying resistance at each UE in multiple different planes of motion Per strength assessment in chart       Chest press in supine Holding 3 lb dumbbells; 20 reps/1 set with cues for correct technique and slow lowering       PNF shoulder exercises in standing    D1 flexion then extension with yellow theratubing resistance; 20 reps/1 set each UE each direction D1 flexion then extension with yellow theratubing resistance; 20 reps/1 set each UE each direction   Standing scaption     Holding 1 lb dumbbells; 20 reps/1 set B UEs; cues to avoid moving up past shoulder height           *given in HEP  Med[employer] Portal   Pt given following band colors: [x] Yellow          [] Red          [] Green          [] Blue          [] Grey     Manual therapy     Modalities (10 minutes): With pt in supported sitting position, moist heat (with 6-8 towel layers) applied to B upper traps/deltoids to reduce muscular tightness and pain. Skin clear and intact. Treatment/Session Summary:    · Response to Treatment:  Patient demonstrating good ability to perform more challenging UE strengthening exercises this session with no reports of increased pain. Will plan on discharge next session as long as no negative changes. No adverse reactions/unusual changes observed/reported in clinical status this session. · Communication/Consultation:  None today  · Equipment provided today:  None today  · Recommendations/Intent for next treatment session: Next visit will focus on manual therapy/modalities to reduce muscular tightness/pain; work on gentle cervical/thoracic strengthening/stability and scapular strengthening.     Total Treatment Billable Duration:  40 Minutes   PT Patient Time In/Time Out  Time In: 1011  Time Out: 65  Kami Pena DPT    Future Appointments   Date Time Provider Roberto Mendieta   6/2/2021 10:15 AM Joe Chance DPT [facility]   6/4/2021  9:30 AM Miguelito Sánchez PTA [facility]   9/20/2021  9:20 AM Heather Ybarra NP [facility]        Visit Approval Visit # Therapist initials Date A NS / Cx < 24 hr >24 hr Cx Comments    1  4/14/2021 [x]  [] [] Initial evaluation    2 PDE 4- [x] [] []     3  4/23/2021 [x] [] []     4 Laurent  4/26 [x] [] []     5  4/28/2021 [x] [] []     6  5/3/2021 [x] [] []     7  5/5/2021 [x] [] []     8  5/12/2021 [x] [] [] Progress note    9 PDE 5- [x] [] []     10  5/20/2021 [x] [] []     11  5/24/2021 [x] [] []     12  5/27/2021 [x] [] []        [] [] []        [] [] []        [] [] []        [] [] []        [] [] []        [] [] []

## 2021-06-02 ENCOUNTER — HOSPITAL ENCOUNTER (OUTPATIENT)
Dept: PHYSICAL THERAPY | Age: 56
Discharge: HOME | End: 2021-06-02
Attending: NURSE PRACTITIONER
Payer: COMMERCIAL

## 2021-06-02 PROCEDURE — 97110 THERAPEUTIC EXERCISES: CPT

## 2021-06-02 NOTE — PROGRESS NOTES
Hansel Huang  : 1965  Primary: Donalynn Gottron Of [facility]*  Secondary:  [facility], [address] [facility], [address]  [phone]   [phone]        OUTPATIENT PHYSICAL THERAPY:Discharge Summary 2021   ICD-10: Treatment Diagnosis: Cervicalgia (M54.2)   Pain in thoracic spine (M54.6)   Stiffness of right shoulder, not elsewhere classified (M25.611)   Precautions/Allergies:   Patient has no known allergies. TREATMENT PLAN:  Effective Dates/Frequency/Duration: Twice per week from 2021 until 2021 (10 weeks). MEDICAL/REFERRING DIAGNOSIS:  Pain in thoracic spine [M54.6]  Other chronic pain [G89.29]   DATE OF ONSET: 1 year ago (acute exacerbation of chronic pain)  REFERRING PHYSICIAN: Lien Mejia NP MD Orders: Evaluate and treat  Return MD Appointment: unspecified     ASSESSMENT:  Hansel Huang has now attended 13 physical therapy sessions who presents to physical therapy for acute exacerbation of chronic mid back pain. During this time, she has met 3 out of 5 of her short term goals and 3 out of 4 of her discharge goals. Several goals were not met, most likely due to pt failing to achieve good compliance with performing her exercises on a daily basis. Will discharge at this time secondary to pt reaching plateau in her functional progress. PROBLEM LIST (Impacting functional limitations):  1. Decreased Strength  2. Decreased ADL/Functional Activities  3. Increased Pain  4. Decreased Activity Tolerance  5. Decreased Work Simplification/Energy Conservation Techniques  6. Increased Fatigue  7. Decreased Flexibility/Joint Mobility  8. Edema/Girth INTERVENTIONS PLANNED: (Treatment may consist of any combination of the following)  1. Bed Mobility  2. Cold  3. Electrical Stimulation  4. Heat  5. Home Exercise Program (HEP)  6. Manual Therapy  7. Neuromuscular Re-education/Strengthening  8.  Range of Motion (ROM)  9. Therapeutic Activites  10. Therapeutic Exercise/Strengthening  11. Transcutaneous Electrical Nerve Stimulation (TENS)  12. Ultrasound (US)     GOALS: (Goals have been discussed and agreed upon with patient.)  Short-Term Functional Goals: Time Frame: 5 weeks  1. Pt will be compliant with HEP in order to increase cervical mobility and UE strength/endurance/mobility to improve functional mobility and overall quality of life. (NOT MET, 6/2/2021)   2. Pt will improve score on the Disabilities of the Arm, Shoulder, and Hand (DASH) Questionnaire to 14/55 in order to improve independence with ADLs and IADLs and to improve overall quality of life. (MET, 6/2/2021)   3. Pt will reduce score on Neck Disability Index to 0/50 in order to demonstrate improved function during activities of daily living. (NOT MET, 6/2/2021)   4. Pt will increase right shoulder internal rotation AROM to level of T10 with minimal to no increase in pain in order to improve functional mobility and ability to wash her back. (MET, 5/12/2021)   5. Pt will be able to demonstrate good body mechanics while lifting 5 lb object up from the floor in order to minimize pain while lifting parts at her job. (MET, 5/12/2021)   Discharge Goals: Time Frame: 10 weeks  1. Pt will be independent with HEP in order to increase cervical mobility and UE strength/endurance/mobility to improve functional mobility and overall quality of life. (MET, 6/2/2021)   2. Pt will improve score on the Disabilities of the Arm, Shoulder, and Hand (DASH) Questionnaire to 11/55 in order to improve independence with ADLs and IADLs and to improve overall quality of life. (NOT MET, 6/2/2021)   3. Pt will increase right shoulder internal rotation AROM to level of T8 with minimal to no increase in pain in order to improve functional mobility and ability to wash her back. (MET, 6/2/2021)   4.  Pt will be able to demonstrate good body mechanics while lifting 15 lb object up from the floor in order to minimize pain while lifting parts at her job. (MET, 6/2/2021)     OUTCOME MEASURE:   Tool Used: Disabilities of the Arm, Shoulder and Hand (DASH) Questionnaire - Quick Version  Score:  Initial: 18/55  Most Recent: 14/55 (Date: 6/2/2021 )   Interpretation of Score: The DASH is designed to measure the activities of daily living in person's with upper extremity dysfunction or pain. Each section is scored on a 1-5 scale, 5 representing the greatest disability. The scores of each section are added together for a total score of 55. Tool Used: Neck Disability Index (NDI)  Score:  Initial: 1/50  Most Recent: 1/50 (Date: 6/2/2021 )   Interpretation of Score: The Neck Disability Index is a revised form of the Oswestry Low Back Pain Index and is designed to measure the activities of daily living in person's with neck pain. Each section is scored on a 0-5 scale, 5 representing the greatest disability. The scores of each section are added together for a total score of 50. FALL RISK:    Ambulatory/Rehab Services H2 Model Falls Risk Assessment   Risk Factors:       No Risk Factors Identified Ability to Rise from Chair:       (0)  Ability to rise in a single movement   Falls Prevention Plan:       No modifications necessary   Total: (5 or greater = High Risk): 0   ©2010 [facility] of "[employer]. All Rights Reserved. [facility] States Patent #[id number].  Federal Law prohibits the replication, distribution or use without written permission from [facility] of [address] [place] [address] FINDINGS:     Reassessment on 6/2/2021   Observation/Orthostatic Postural Assessment:    The following postural deficits were noted in sitting: loss of cervical lordosis, increased thoracic kyphosis, forward head, rounded shoulders and posterior pelvic tilt  - slightly improved upright posture in sitting on 6/2/2021  The following postural deficits were noted in standing: excessive lumbar lordosis; pt overweight  Palpation: Central and unilateral (each side) PA mobilizations at C6-T7 painful and stiff; central PA mobilizations at lumbar and cervical spine slightly stiff but not tender  ROM:    Initial measurement: (on 4/14/2021) Initial measurement: (on 4/14/2021) Reassessment measurement: (on 5/12/2021) Reassessment measurement: (on 6/2/2021)     WFL and non-painful throughout L shoulder, elbow, and hand  L shoulder IR AROM to level of T8 WFL throughout R shoulder, elbow and wrist, but limited with following motions: R shoulder IR AROM limited to level of T11   R shoulder IR AROM limited to level of T10 R shoulder IR AROM at level of T8     Initial measurement: (on 4/14/2021) Reassessment measurement: (on 5/12/2021) Reassessment measurement: (on 6/2/2021)     Cervical AROM  Flexion (% of normal): 100%  Extension (% of normal): 100%  L sidebending (% of normal): 100%  R sidebending (% of normal): 100%  L rotation (% of normal): 90%  R rotation (% of normal): 100%    Thoracic AROM  Flexion (% of normal): 100% (increased tightness)  Extension (% of normal): 80%  L sidebending (% of normal): 100% (increased tightness)  R sidebending (% of normal): 100% (increased tightness)  L rotation (% of normal): 100%  R rotation (% of normal): 100% Cervical AROM  Flexion (% of normal): 100%  Extension (% of normal): 100%  L sidebending (% of normal): 100%  R sidebending (% of normal): 100%  L rotation (% of normal): 95%  R rotation (% of normal): 100%    Thoracic AROM  Flexion (% of normal): 100% (increased tightness)  Extension (% of normal): 90%  L sidebending (% of normal): 100%  R sidebending (% of normal): 100%   L rotation (% of normal): 100%  R rotation (% of normal): 100%      Cervical AROM *pain with generalized movement  Flexion (% of normal): 100%   Extension (% of normal): 100%  L sidebending (% of normal): 100%  R sidebending (% of normal): 100%  L rotation (% of normal): 95%  R rotation (% of normal): 100%    Thoracic AROM  Flexion (% of normal): 100% (increased tightness)  Extension (% of normal): 95%  L sidebending (% of normal): 100%  R sidebending (% of normal): 100%   L rotation (% of normal): 100%  R rotation (% of normal): 100%     Strength:    Initial measurement: (on 4/14/2021) Initial measurement: (on 4/14/2021)  Reassessment  (on 5/12/2021) Reassessment (on 5/12/2021) Reassessment (on 6/2/2021) Reassessment on 6/2/2021)    L UE: R UE: L UE: R UE: L LE: R LE:   Shoulder flexion  4/5  4+/5  4+/5 4/5 4+/5 4+/5   Shoulder extension 5/5  5/5  5/5 5/5 5/5 5/5   Shoulder abduction 4/5  4/5  4+/5 4/5 4+/5 4/5   Shoulder adduction 4+/5  4+/5  5/5 5/5 5/5 5/5   Shoulder IR  5/5  5/5  5/5 5/5 4+/5 5/5   Shoulder ER 4+/5  4/5  4+/5 4+/5 5/5 5/5   Elbow flexion 5/5  5/5  5/5 5/5 5/5 5/5   Elbow extension 5/5  5/5  5/5 5/5 5/5 5/5   Wrist flexion  5/5  5/5  5/5 5/5 5/5 5/5   Wrist extension 5/5  5/5  5/5 5/5 5/5 5/5      Thank you for this referral,  Mike Weems DPT     Referring Physician Signature: Mihaela Small NP No Signature is Required for this note.

## 2021-06-02 NOTE — PROGRESS NOTES
Meseret Groverigham  : 1965  Primary: Jayla Ramos Of [facility]*  Secondary:  [facility], [address] [facility]", [place] [address] [address] [facility]  [phone]   [phone]       OUTPATIENT PHYSICAL THERAPY: Daily Treatment Note 2021  Visit Count:  13  ICD-10: Treatment Diagnosis: Cervicalgia (M54.2)   Pain in thoracic spine (M54.6)   Stiffness of right shoulder, not elsewhere classified (M25.611)   Precautions/Allergies:   Patient has no known allergies. TREATMENT PLAN:  Effective Dates/Frequency/Duration: Twice per week from 2021 until 2021 (10 weeks). Pre-treatment Symptoms/Complaints: Pt states her neck and shoulders hurt a little today - she thinks it was because she slept \"wrong\" a few nights ago (on ) - states she is ready for discharge today  Pain: Initial:   8/10 Post Session: 5/10   Medications Last Reviewed:  2021  Updated Objective Findings: See evaluation note from today   TREATMENT:   THERAPEUTIC EXERCISE: (39 minutes):  Exercises per grid below (and assessment in chart on 2021) to improve mobility and strength. Required minimal verbal cues to promote proper body alignment and promote proper body posture. Progressed resistance as indicated. Date:  2021 Date:  2021 Date:  2021 Date:  2021 Date:  2021   Activity/Exercise        UBE Level 4   / to increase shoulder mobility and blood flow.   Level 4 resistance for 4 minutes forward and 4 minutes backward to increase shoulder mobility and blood flow Level 4 resistance for 5 minutes forward and 5 minutes backward to increase shoulder mobility and blood flow Level 4 resistance for 5 minutes forward and 5 minutes backward to increase shoulder mobility and blood flow Level 4 resistance for 5 minutes forward and 5 minutes backward to increase shoulder mobility and blood flow   Nustep        Standing shoulder rows* Orange tubing   X 20 with cues for slow and controlled movements  Orange theratubing resistance; 20 reps/1 set with cues for slow controlled movement and avoiding shoulder elevation Orange theratubing resistance; 20 reps/1 set with cues for slow controlled movement and avoiding shoulder elevation Orange theratubing resistance; 20 reps/1 set with cues for slow controlled movement and avoiding shoulder elevation    Standing shoulder exension* Green tubing   X 20 with cues for slow and controled movements  Green theratubing resistance; 20 reps/1 set with cues for avoiding shoulder elevation and to maintain elbow extension throughout Green progressing to orange theratubing resistance; 20 reps/1 set with cues for avoiding shoulder elevation and to maintain elbow extension throughout Orange theratubing resistance; 20 reps/1 set with cues for avoiding shoulder elevation and to maintain elbow extension throughout    Shoulder shrugs*        Backward shoulder circles        Standing shoulder ER/IR* Yellow tubing   X 20 B each direction with slow and controlled movements  Yellow tubing   X 20 B each direction with slow and controlled movements  Yellow tubing   X 20 B each direction with slow and controlled movements Yellow tubing   X 20 B each direction with slow and controlled movements    STanding protraction         Upper trap stretch in sitting*     30 second hold x 3 reps/1 set each direction; cues to \"anchor\" arm at side for improved stretch - instructed pt how to modify stretch to target levators and sternocleidomastoid   Chest press in standing Yellow   X 20 with cues for slow and controlled movements    Yellow tubing resistance; 20 reps/1 set with cues for slow controlled movements    Standing bicep curls    3 lb dumbbells; 20 reps/1 set B UEs with cues for slow lowering    Cervical/thoracic AROM     Per ROM assessment in chart   Varying resistance at each UE in multiple different planes of motion     Per strength assessment in chart   Chest press in supine        PNF shoulder exercises in standing   D1 flexion then extension with yellow theratubing resistance; 20 reps/1 set each UE each direction D1 flexion then extension with yellow theratubing resistance; 20 reps/1 set each UE each direction    Standing scaption    Holding 1 lb dumbbells; 20 reps/1 set B UEs; cues to avoid moving up past shoulder height    Education on self suboccipital release     Therapist performing progressing to having pt perform with towel rolled up underneath suboccipital region   Levator scapulae stretch in supine     30 second hold x 3 reps each direction; passive stretch by therapist with concurrent STM to levator scapulae   Lifting 15 lb box     Pt able to lift x 3 reps with good body mechanics with only initial minimal verbal/visual cues                   *given in HEP  [employer] Portal   Pt given following band colors: [x] Yellow          [] Red          [] Green          [] Blue          [] Grey     Manual therapy     Modalities (10 minutes): With pt in supported sitting position, moist heat (with 6-8 towel layers) applied to B upper traps/deltoids to reduce muscular tightness and pain. Skin clear and intact. Treatment/Session Summary:    · Response to Treatment:  Discharge note today - see assessment in chart. Therapist instructing pt to have MD send new order if she continues to have recurring pain within next several months - pt verbalizing understanding. No adverse reactions/unusual changes observed/reported in clinical status this session.   · Communication/Consultation:  None today    Total Treatment Billable Duration:  39 Minutes   PT Patient Time In/Time Out  Time In: 1015  Time Out: Sujit Joseph [id number], DPT    Future Appointments   Date Time Provider Roberto Mendieta   9/20/2021  9:20 AM Mihaela Small NP [facility]        Visit Approval Visit # Therapist initials Date A NS / Cx < 24 hr >24 hr Cx Comments    1  4/14/2021 [x]  [] [] Initial evaluation    2 PDE 4- [x] [] []     3  4/23/2021 [x] [] []     4 Laurent  4/26 [x] [] []     5  4/28/2021 [x] [] []     6  5/3/2021 [x] [] []     7  5/5/2021 [x] [] []     8  5/12/2021 [x] [] [] Progress note    9 PDE 5- [x] [] []     10  5/20/2021 [x] [] []     11  5/24/2021 [x] [] []     12  5/27/2021 [x] [] []     13  6/2/2021 [x] [] [] Discharge note       [] [] []        [] [] []        [] [] []        [] [] []        [] [] []

## 2021-06-04 ENCOUNTER — APPOINTMENT (OUTPATIENT)
Dept: PHYSICAL THERAPY | Age: 56
End: 2021-06-04
Attending: NURSE PRACTITIONER
Payer: COMMERCIAL

## 2022-03-11 ENCOUNTER — TRANSCRIBE ORDER (OUTPATIENT)
Dept: SCHEDULING | Age: 57
End: 2022-03-11

## 2022-03-11 DIAGNOSIS — Z12.31 VISIT FOR SCREENING MAMMOGRAM: Primary | ICD-10-CM

## 2022-03-19 PROBLEM — R73.03 PREDIABETES: Status: ACTIVE | Noted: 2021-03-23

## 2022-04-07 ENCOUNTER — HOSPITAL ENCOUNTER (OUTPATIENT)
Dept: MAMMOGRAPHY | Age: 57
Discharge: HOME | End: 2022-04-07
Attending: FAMILY MEDICINE
Payer: COMMERCIAL

## 2022-04-07 DIAGNOSIS — Z12.31 VISIT FOR SCREENING MAMMOGRAM: ICD-10-CM

## 2022-04-07 PROCEDURE — 77063 BREAST TOMOSYNTHESIS BI: CPT

## 2022-07-21 ENCOUNTER — OFFICE VISIT (OUTPATIENT)
Dept: FAMILY MEDICINE CLINIC | Facility: CLINIC | Age: 57
End: 2022-07-21
Payer: COMMERCIAL

## 2022-07-21 VITALS
HEART RATE: 78 BPM | SYSTOLIC BLOOD PRESSURE: 105 MMHG | WEIGHT: 184 LBS | HEIGHT: 61 IN | OXYGEN SATURATION: 100 % | DIASTOLIC BLOOD PRESSURE: 78 MMHG | BODY MASS INDEX: 34.74 KG/M2

## 2022-07-21 DIAGNOSIS — Z13.220 ENCOUNTER FOR SCREENING FOR LIPID DISORDER: ICD-10-CM

## 2022-07-21 DIAGNOSIS — K21.9 GASTROESOPHAGEAL REFLUX DISEASE WITHOUT ESOPHAGITIS: ICD-10-CM

## 2022-07-21 DIAGNOSIS — Z11.59 ENCOUNTER FOR HEPATITIS C SCREENING TEST FOR LOW RISK PATIENT: ICD-10-CM

## 2022-07-21 DIAGNOSIS — R73.03 PREDIABETES: Primary | ICD-10-CM

## 2022-07-21 DIAGNOSIS — Z79.899 MEDICATION MANAGEMENT: ICD-10-CM

## 2022-07-21 DIAGNOSIS — E66.9 OBESITY (BMI 30-39.9): ICD-10-CM

## 2022-07-21 DIAGNOSIS — B35.3 TINEA PEDIS OF BOTH FEET: ICD-10-CM

## 2022-07-21 LAB — HBA1C MFR BLD: 6 %

## 2022-07-21 PROCEDURE — 83036 HEMOGLOBIN GLYCOSYLATED A1C: CPT | Performed by: NURSE PRACTITIONER

## 2022-07-21 PROCEDURE — 99396 PREV VISIT EST AGE 40-64: CPT | Performed by: NURSE PRACTITIONER

## 2022-07-21 ASSESSMENT — PATIENT HEALTH QUESTIONNAIRE - PHQ9
2. FEELING DOWN, DEPRESSED OR HOPELESS: 0
1. LITTLE INTEREST OR PLEASURE IN DOING THINGS: 0
SUM OF ALL RESPONSES TO PHQ QUESTIONS 1-9: 0
SUM OF ALL RESPONSES TO PHQ9 QUESTIONS 1 & 2: 0

## 2022-07-21 ASSESSMENT — ENCOUNTER SYMPTOMS
SHORTNESS OF BREATH: 0
GASTROINTESTINAL NEGATIVE: 1
BACK PAIN: 1

## 2022-07-21 NOTE — ASSESSMENT & PLAN NOTE
At goal, continue current treatment plan, lifestyle modifications recommended and provided diabetic diet education and hand out. Encouraged weight reduction closer to BMI 25 and regular exercise as well as portion control. Discussed with pt warning signs of diabetes as well to look for and f/u for.

## 2022-07-21 NOTE — ASSESSMENT & PLAN NOTE
Discussed with patient ideal BMI is closer to 25. Recommended avoid concentrated sweets, added sugars, high carbohydrate foods, processed foods, and fast foods. Recommended regular exercise.

## 2022-07-21 NOTE — PROGRESS NOTES
Rosina Chester is a 59 y.o. female who presents today for the following:  Chief Complaint   Patient presents with    Follow-up       No Known Allergies    Current Outpatient Medications   Medication Sig Dispense Refill    cyclobenzaprine (FLEXERIL) 5 MG tablet Take 5 mg by mouth 2 times daily as needed      fluticasone (FLONASE) 50 MCG/ACT nasal spray 2 sprays by Nasal route as needed      loratadine (CLARITIN) 10 MG tablet Take 10 mg by mouth daily as needed      olopatadine (PATADAY) 0.2 % SOLN ophthalmic solution Apply 1 drop to eye daily as needed      triamcinolone (KENALOG) 0.1 % cream Apply topically 2 times daily       No current facility-administered medications for this visit. Past Medical History:   Diagnosis Date    Arthritis     in the left knee and heel    Elevated blood pressure reading without diagnosis of hypertension 71/96/3380    Folliculitis 53/1/1271    Folliculitis 68/5/6542    GERD (gastroesophageal reflux disease)     History of itching 3/13/2017    Nail thickening 12/1/2015    Urticaria 3/13/2017       Past Surgical History:   Procedure Laterality Date    COLONOSCOPY N/A 6/7/2019    COLONOSCOPY/ 36 performed by Keisha Quesada MD at [facility] ENDOSCOPY    COLONOSCOPY FLX DX W/COLLJ SPEC WHEN PFRMD  6/7/2019         TONSILLECTOMY  2015       Social History     Tobacco Use    Smoking status: Never    Smokeless tobacco: Never   Substance Use Topics    Alcohol use: No     Alcohol/week: 0.0 standard drinks        Family History   Problem Relation Age of Onset    Cancer Sister         lung    Breast Cancer Neg Hx     Stroke Father     Hypertension Father        HPI   Pt presents for follow up, due for health maintenance. She has hx of prediabetes, chronic back pain, obesity, and GERD. She has no complaints or concerns today. Review of Systems   Constitutional:  Negative for fatigue. Eyes:  Negative for visual disturbance. Respiratory:  Negative for shortness of breath. Cardiovascular:  Negative for chest pain, palpitations and leg swelling. Gastrointestinal: Negative. Genitourinary:  Negative for difficulty urinating. Musculoskeletal:  Positive for arthralgias, back pain and neck pain. Negative for joint swelling. Right knee 1-2 days. Skin:  Negative for rash. Neurological:  Negative for dizziness and headaches. Psychiatric/Behavioral:  Negative for dysphoric mood. The patient is not nervous/anxious. /78   Pulse 78   Ht 5' 1\" (1.549 m)   Wt 184 lb (83.5 kg)   SpO2 100%   BMI 34.77 kg/m²     Physical Exam  Constitutional:       General: She is not in acute distress. Appearance: Normal appearance. She is obese. She is not ill-appearing. HENT:      Head: Normocephalic and atraumatic. Right Ear: External ear normal.      Left Ear: External ear normal.   Eyes:      Extraocular Movements: Extraocular movements intact. Conjunctiva/sclera: Conjunctivae normal.      Pupils: Pupils are equal, round, and reactive to light. Cardiovascular:      Rate and Rhythm: Normal rate and regular rhythm. Pulses: Normal pulses. Heart sounds: Normal heart sounds. Pulmonary:      Effort: Pulmonary effort is normal.      Breath sounds: Normal breath sounds. Abdominal:      General: Bowel sounds are normal. There is no distension. Palpations: Abdomen is soft. Tenderness: There is no abdominal tenderness. Musculoskeletal:         General: Normal range of motion. Cervical back: Normal range of motion and neck supple. No rigidity or tenderness. Right lower leg: No edema. Left lower leg: No edema. Lymphadenopathy:      Cervical: No cervical adenopathy. Skin:     General: Skin is warm and dry. Coloration: Skin is not jaundiced or pale. Neurological:      General: No focal deficit present. Mental Status: She is alert and oriented to person, place, and time.    Psychiatric:         Mood and Affect: Mood normal.         Behavior: Behavior normal.         Thought Content: Thought content normal.         Judgment: Judgment normal.        1. Prediabetes  Assessment & Plan:   At goal, continue current treatment plan, lifestyle modifications recommended and provided diabetic diet education and hand out. Encouraged weight reduction closer to BMI 25 and regular exercise as well as portion control. Discussed with pt warning signs of diabetes as well to look for and f/u for. Orders:  -     AMB POC HEMOGLOBIN A1C  -     Comprehensive Metabolic Panel; Future  2. Tinea pedis of both feet  Assessment & Plan:   Monitored by specialist- no acute findings meriting change in the plan  3. Encounter for screening for lipid disorder  -     Lipid Panel; Future  4. Gastroesophageal reflux disease without esophagitis  Assessment & Plan:   Well-controlled, continue current medications, continue current treatment plan and lifestyle modifications recommended  Orders:  -     Comprehensive Metabolic Panel; Future  -     CBC; Future  5. Obesity (BMI 30-39. 9)  Assessment & Plan:   Discussed with patient ideal BMI is closer to 25. Recommended avoid concentrated sweets, added sugars, high carbohydrate foods, processed foods, and fast foods. Recommended regular exercise. Orders:  -     Comprehensive Metabolic Panel; Future  -     CBC; Future  6. Medication management  -     Comprehensive Metabolic Panel; Future  -     CBC; Future  7. Encounter for hepatitis C screening test for low risk patient  -     Hepatitis C Ab, Rflx to Qt by PCR; Future     Health maintenance: Patient reports she believes she had hepatitis C+ antibody but is unclear when she was donating blood. Patient is requesting hepatitis C screening. Mammogram completed in April (due 2023). Last pap 12/10/2018 was normal (due 2023). Last colonoscopy 06/07/2019 (next due 2029) without polyps. Declines HIV today. Declines tetanus and shingles vaccine.     Patient informed, we will call with blood work results within one week. If you have not heard regarding results in over a week, please contact office. You can also [facility] results on [facility]. Follow-up and Dispositions    Return in about 1 year (around 7/21/2023) for Labs, Annual/print AVS please.          SILVIA Jo - CNP

## 2022-07-27 LAB
ALBUMIN SERPL-MCNC: 3.9 G/DL (ref 3.5–5)
ALBUMIN/GLOB SERPL: 0.9 {RATIO} (ref 1.2–3.5)
ALP SERPL-CCNC: 72 U/L (ref 50–136)
ALT SERPL-CCNC: 24 U/L (ref 12–65)
ANION GAP SERPL CALC-SCNC: 8 MMOL/L (ref 7–16)
AST SERPL-CCNC: 19 U/L (ref 15–37)
BILIRUB SERPL-MCNC: 0.2 MG/DL (ref 0.2–1.1)
BUN SERPL-MCNC: 15 MG/DL (ref 6–23)
CALCIUM SERPL-MCNC: 9.7 MG/DL (ref 8.3–10.4)
CHLORIDE SERPL-SCNC: 108 MMOL/L (ref 98–107)
CHOLEST SERPL-MCNC: 122 MG/DL
CO2 SERPL-SCNC: 25 MMOL/L (ref 21–32)
CREAT SERPL-MCNC: 1 MG/DL (ref 0.6–1)
ERYTHROCYTE [DISTWIDTH] IN BLOOD BY AUTOMATED COUNT: 14.2 % (ref 11.9–14.6)
GLOBULIN SER CALC-MCNC: 4.4 G/DL (ref 2.3–3.5)
GLUCOSE SERPL-MCNC: 88 MG/DL (ref 65–100)
HCT VFR BLD AUTO: 39.9 % (ref 35.8–46.3)
HDLC SERPL-MCNC: 67 MG/DL (ref 40–60)
HDLC SERPL: 1.8 {RATIO}
HGB BLD-MCNC: 12.6 G/DL (ref 11.7–15.4)
LDLC SERPL CALC-MCNC: 38.4 MG/DL
MCH RBC QN AUTO: 30 PG (ref 26.1–32.9)
MCHC RBC AUTO-ENTMCNC: 31.6 G/DL (ref 31.4–35)
MCV RBC AUTO: 95 FL (ref 79.6–97.8)
NRBC # BLD: 0 K/UL (ref 0–0.2)
PLATELET # BLD AUTO: 253 K/UL (ref 150–450)
PMV BLD AUTO: 11 FL (ref 9.4–12.3)
POTASSIUM SERPL-SCNC: 3.9 MMOL/L (ref 3.5–5.1)
PROT SERPL-MCNC: 8.3 G/DL (ref 6.3–8.2)
RBC # BLD AUTO: 4.2 M/UL (ref 4.05–5.2)
SODIUM SERPL-SCNC: 141 MMOL/L (ref 136–145)
TRIGL SERPL-MCNC: 83 MG/DL (ref 35–150)
VLDLC SERPL CALC-MCNC: 16.6 MG/DL (ref 6–23)
WBC # BLD AUTO: 7.4 K/UL (ref 4.3–11.1)

## 2022-07-28 LAB
HCV AB S/CO SERPL IA: 0.1 S/CO RATIO (ref 0–0.9)
HCV AB SERPL QL IA: NORMAL

## 2022-10-14 PROBLEM — M19.079 ARTHRITIS OF ANKLE: Status: ACTIVE | Noted: 2022-10-14

## 2022-10-14 PROBLEM — M17.10 ARTHRITIS OF KNEE: Status: ACTIVE | Noted: 2022-10-14

## 2022-11-02 ENCOUNTER — OFFICE VISIT (OUTPATIENT)
Dept: OCCUPATIONAL MEDICINE | Age: 57
End: 2022-11-02

## 2022-11-02 VITALS
OXYGEN SATURATION: 99 % | RESPIRATION RATE: 14 BRPM | SYSTOLIC BLOOD PRESSURE: 118 MMHG | HEART RATE: 66 BPM | TEMPERATURE: 97.3 F | DIASTOLIC BLOOD PRESSURE: 78 MMHG

## 2022-11-02 DIAGNOSIS — J30.2 SEASONAL ALLERGIC RHINITIS, UNSPECIFIED TRIGGER: Primary | ICD-10-CM

## 2022-11-02 RX ORDER — FLUTICASONE PROPIONATE 50 MCG
2 SPRAY, SUSPENSION (ML) NASAL DAILY
Qty: 1 EACH | Refills: 2 | Status: SHIPPED | OUTPATIENT
Start: 2022-11-02 | End: 2023-01-31

## 2022-11-02 ASSESSMENT — ENCOUNTER SYMPTOMS
WHEEZING: 0
SHORTNESS OF BREATH: 0
COUGH: 0
SWOLLEN GLANDS: 0
HOARSE VOICE: 0
CHEST TIGHTNESS: 0
SORE THROAT: 0
SINUS COMPLAINT: 1
SINUS PRESSURE: 1

## 2022-11-02 NOTE — PROGRESS NOTES
Subjective:      Patient ID: Pepper Cintron is a 59 y.o. female. Sinus Problem  This is a new problem. Episode onset: about 1 month. The problem is unchanged. There has been no fever. Associated symptoms include congestion, headaches, sinus pressure and sneezing. Pertinent negatives include no chills, coughing, diaphoresis, ear pain, hoarse voice, neck pain, shortness of breath, sore throat or swollen glands. Treatments tried: Occasionally take Loratadine. The treatment provided mild relief. Review of Systems   Constitutional:  Negative for chills and diaphoresis. HENT:  Positive for congestion, sinus pressure and sneezing. Negative for ear pain, hoarse voice and sore throat. Respiratory:  Negative for cough, chest tightness, shortness of breath and wheezing. Cardiovascular:  Negative for chest pain. Musculoskeletal:  Negative for neck pain. Neurological:  Positive for headaches. Negative for dizziness. No Known Allergies   Current Outpatient Medications on File Prior to Visit   Medication Sig Dispense Refill    cyclobenzaprine (FLEXERIL) 5 MG tablet Take 5 mg by mouth 2 times daily as needed      loratadine (CLARITIN) 10 MG tablet Take 10 mg by mouth daily as needed      olopatadine (PATADAY) 0.2 % SOLN ophthalmic solution Apply 1 drop to eye daily as needed      triamcinolone (KENALOG) 0.1 % cream Apply topically 2 times daily      fluticasone (FLONASE) 50 MCG/ACT nasal spray 2 sprays by Nasal route as needed (Patient not taking: Reported on 11/2/2022)       No current facility-administered medications on file prior to visit. Objective:   Physical Exam  Vitals reviewed. Constitutional:       General: She is not in acute distress. Appearance: Normal appearance. She is not ill-appearing. HENT:      Head: Normocephalic.       Right Ear: Hearing, tympanic membrane, ear canal and external ear normal.      Left Ear: Hearing, tympanic membrane, ear canal and external ear normal.      Nose: Mucosal edema present. Right Turbinates: Pale. Left Turbinates: Pale. Right Sinus: No maxillary sinus tenderness or frontal sinus tenderness. Left Sinus: No maxillary sinus tenderness or frontal sinus tenderness. Mouth/Throat:      Lips: Pink. Mouth: Mucous membranes are moist.      Pharynx: Uvula midline. No pharyngeal swelling, oropharyngeal exudate or uvula swelling. Comments: Cobblestone appearance  Cardiovascular:      Rate and Rhythm: Normal rate and regular rhythm. Heart sounds: Normal heart sounds. Pulmonary:      Effort: Pulmonary effort is normal. No respiratory distress. Breath sounds: Normal breath sounds. No stridor. No wheezing or rhonchi. Skin:     General: Skin is warm and dry. Neurological:      Mental Status: She is alert and oriented to person, place, and time. Psychiatric:         Mood and Affect: Mood normal.         Behavior: Behavior normal.         Thought Content: Thought content normal.         Judgment: Judgment normal.     Vitals:    11/02/22 0717   BP: 118/78   Pulse: 66   Resp: 14   Temp: 97.3 °F (36.3 °C)   SpO2: 99%        Assessment:       Diagnosis Orders   1. Seasonal allergic rhinitis, unspecified trigger  fluticasone (FLONASE) 50 MCG/ACT nasal spray             Plan:      P:    * Rxs provided: Flonase nasal spray, 2 sprays in nares daily,(during allergy season- Fall) #1 each, RF x 2   Recommendations:   Claritin and Flonase daily  Recommended saline rinses with Ocean Mist spray 1 hour before or after Flonase. Encouraged increase fluid intake for adequate hydration. Reviewed good hand hygiene. Tylenol/Ibuprofen for aches/pains. Risks and benefits of medications reviewed with patient . Follow up: 7 days if not any better or sooner with PCP or Urgent Care if s/s worse   Seek ER care for chest pain or SOB. Patient voices understanding and agrees with the plan of care as described above.    Labs performed/ordered:  Educational information:  Educational material reviewed and provided at checkout re:_____  Follow up: Patient was encouraged to return to the clinic and/or PCP if s/s persist or do not resolve completely. Also advised to seek emergent care if worsening signs and symptoms warrant immediate evaluation including, but not limited to HA, blurred vision, speech disturbance, difficulty with ambulation/gait, numbness, tingling, weakness, syncope, abdominal pain, chest pain, or shortness of breath. *Side effects, adverse effects, risks versus benefits associated with medications prescribed/recommended were discussed with the patient. Patient verbalized understanding. All questions answered.       * I have reviewed the patient's medication list, past medical, family, social, and surgical    history and updated the patient record appropriately      Social History     Socioeconomic History    Marital status:      Spouse name: Not on file    Number of children: Not on file    Years of education: Not on file    Highest education level: Not on file   Occupational History    Not on file   Tobacco Use    Smoking status: Never    Smokeless tobacco: Never   Substance and Sexual Activity    Alcohol use: No     Alcohol/week: 0.0 standard drinks    Drug use: Not Currently    Sexual activity: Not on file   Other Topics Concern    Not on file   Social History Narrative    Not on file     Social Determinants of Health     Financial Resource Strain: Not on file   Food Insecurity: Not on file   Transportation Needs: Not on file   Physical Activity: Not on file   Stress: Not on file   Social Connections: Not on file   Intimate Partner Violence: Not on file   Housing Stability: Not on file     Past Medical History:   Diagnosis Date    Arthritis     in the left knee and heel    Elevated blood pressure reading without diagnosis of hypertension 10/52/2255    Folliculitis 06/7/7270    Folliculitis 12/1/2015    GERD (gastroesophageal reflux disease)     History of itching 3/13/2017    Nail thickening 12/1/2015    Urticaria 3/13/2017     Past Surgical History:   Procedure Laterality Date    COLONOSCOPY N/A 6/7/2019    COLONOSCOPY/ 36 performed by Alise Lebron MD at [facility] ENDOSCOPY    COLONOSCOPY FLX DX W/COLLJ SPEC WHEN PFRMD  6/7/2019         TONSILLECTOMY  2015     Family History   Problem Relation Age of Onset    Cancer Sister         lung    Breast Cancer Neg Hx     Stroke Father     Hypertension Father              Gaetano Fitting.  Dustin Elizabeth - CNP

## 2022-12-14 ENCOUNTER — OFFICE VISIT (OUTPATIENT)
Dept: FAMILY MEDICINE CLINIC | Facility: CLINIC | Age: 57
End: 2022-12-14
Payer: COMMERCIAL

## 2022-12-14 VITALS
DIASTOLIC BLOOD PRESSURE: 80 MMHG | TEMPERATURE: 98.5 F | OXYGEN SATURATION: 100 % | WEIGHT: 190.8 LBS | HEIGHT: 61 IN | SYSTOLIC BLOOD PRESSURE: 140 MMHG | HEART RATE: 77 BPM | BODY MASS INDEX: 36.02 KG/M2

## 2022-12-14 DIAGNOSIS — N89.8 VAGINAL DISCHARGE: Primary | ICD-10-CM

## 2022-12-14 DIAGNOSIS — N89.8 VAGINAL DISCHARGE: ICD-10-CM

## 2022-12-14 PROCEDURE — 99213 OFFICE O/P EST LOW 20 MIN: CPT | Performed by: NURSE PRACTITIONER

## 2022-12-14 ASSESSMENT — ENCOUNTER SYMPTOMS
EYES NEGATIVE: 1
RESPIRATORY NEGATIVE: 1
GASTROINTESTINAL NEGATIVE: 1
ALLERGIC/IMMUNOLOGIC NEGATIVE: 1

## 2022-12-14 NOTE — PROGRESS NOTES
[address]  [address] [place], [place] [address]  [place], [address] [facility]   (ph) [phone] (fax) [phone]  Grace VEGA, FNP-C      Chief Complaint   Patient presents with    Vaginal Discharge     White discharge with odor, for a couple of months and this is the first time pt had it looked into       58 yo female comes in c/o a vaginal discharge. She reports that she has had it for awhile. She is  and in a monogamous relationship. She is not concerned about STDs. She denies itching or irritation, just has to wear a pad to keep from ruining her underwear. Reports that she is postmenopausal. Denies pelvic pain or dysuria. Vaginal Discharge  The patient's primary symptoms include vaginal discharge.      No Known Allergies    Past Medical History:   Diagnosis Date    Arthritis     in the left knee and heel    Elevated blood pressure reading without diagnosis of hypertension 95/26/1869    Folliculitis 56/1/2266    Folliculitis 69/4/7965    GERD (gastroesophageal reflux disease)     History of itching 3/13/2017    Nail thickening 12/1/2015    Urticaria 3/13/2017       Family History   Problem Relation Age of Onset    Cancer Sister         lung    Breast Cancer Neg Hx     Stroke Father     Hypertension Father        Social History     Socioeconomic History    Marital status:      Spouse name: Not on file    Number of children: Not on file    Years of education: Not on file    Highest education level: Not on file   Occupational History    Not on file   Tobacco Use    Smoking status: Never    Smokeless tobacco: Never   Substance and Sexual Activity    Alcohol use: No     Alcohol/week: 0.0 standard drinks    Drug use: Not Currently    Sexual activity: Not on file   Other Topics Concern    Not on file   Social History Narrative    Not on file     Social Determinants of Health     Financial Resource Strain: Not on file   Food Insecurity: Not on file   Transportation Needs: Not on file   Physical Activity: Not on file   Stress: Not on file   Social Connections: Not on file   Intimate Partner Violence: Not on file   Housing Stability: Not on file       OB History          2    Para        Term                AB        Living             SAB        IAB        Ectopic        Molar        Multiple        Live Births                    Past Surgical History:   Procedure Laterality Date    COLONOSCOPY N/A 2019    COLONOSCOPY/ 36 performed by Kendra Abebe MD at [facility] ENDOSCOPY    COLONOSCOPY FLX DX W/COLLJ SPEC WHEN PFRMD  2019         TONSILLECTOMY         Health Maintenance   Topic Date Due    COVID-19 Vaccine (1) Never done    HIV screen  Never done    DTaP/Tdap/Td vaccine (1 - Tdap) Never done    Shingles vaccine (1 of 2) Never done    Flu vaccine (1) Never done    A1C test (Diabetic or Prediabetic)  2023    Depression Screen  2023    Cervical cancer screen  2023    Breast cancer screen  2024    Lipids  2027    Colorectal Cancer Screen  2029    Hepatitis C screen  Completed    Hepatitis A vaccine  Aged Out    Hib vaccine  Aged Out    Meningococcal (ACWY) vaccine  Aged Out    Pneumococcal 0-64 years Vaccine  Aged Out         Current Outpatient Medications:     fluticasone (FLONASE) 50 MCG/ACT nasal spray, 2 sprays by Each Nostril route daily, Disp: 1 each, Rfl: 2    cyclobenzaprine (FLEXERIL) 5 MG tablet, Take 5 mg by mouth 2 times daily as needed, Disp: , Rfl:     fluticasone (FLONASE) 50 MCG/ACT nasal spray, 2 sprays by Nasal route as needed (Patient not taking: Reported on 2022), Disp: , Rfl:     loratadine (CLARITIN) 10 MG tablet, Take 10 mg by mouth daily as needed, Disp: , Rfl:     olopatadine (PATADAY) 0.2 % SOLN ophthalmic solution, Apply 1 drop to eye daily as needed, Disp: , Rfl:     triamcinolone (KENALOG) 0.1 % cream, Apply topically 2 times daily, Disp: , Rfl:     Review of Systems   Constitutional: Negative. HENT: Negative. Eyes: Negative. Respiratory: Negative. Cardiovascular: Negative. Gastrointestinal: Negative. Endocrine: Negative. Genitourinary:  Positive for vaginal discharge. Musculoskeletal: Negative. Skin: Negative. Allergic/Immunologic: Negative. Neurological: Negative. Hematological: Negative. Psychiatric/Behavioral: Negative. Vitals:    12/14/22 1523   BP: (!) 140/80   Pulse: 77   Temp: 98.5 °F (36.9 °C)   SpO2: 100%        Physical Exam     Constitutional: She appears well-developed and well-nourished. HENT:   Head: Normocephalic. Genitourinary:  There is no rash, tenderness, lesion or injury on the right labia. There is no rash, tenderness, lesion or injury on the left labia. Uterus is not deviated, not enlarged and not tender. Cervix exhibits no motion tenderness, no discharge and no friability. Right adnexum displays no mass, no tenderness and no fullness. Left adnexum displays no mass, no tenderness and no fullness. No erythema, tenderness or bleeding in the vagina. No foreign body in the vagina. No signs of injury around the vagina. Slight yellowish non-odorous  vaginal discharge found. Skin: Skin is warm, dry and intact. No cyanosis. Nails show no clubbing. Psychiatric: She has a normal mood and affect. Her speech is normal and behavior is normal. Judgment and thought content normal. Cognition and memory are normal.   Nursing note and vitals reviewed. Assessment & Plan:    1. Vaginal discharge  - Nuswab Vaginitis Plus (VG+); Future  Will notify of culture    Greater than 50% counseling and/or coordination of care: Will notify of culture. This note was dictated using dragon voice recognition software. It has been proofread, but there may still exist voice recognition errors that the author did not detect. Other: Due to significant time constraints, we did not have time to discuss any other health maintenance topics today. Signed By: Charles David, SILVIA - ZOLTAN     December 14, 2022

## 2022-12-17 LAB
A VAGINAE DNA VAG QL NAA+PROBE: NORMAL SCORE
BVAB2 DNA VAG QL NAA+PROBE: NORMAL SCORE
C ALBICANS DNA VAG QL NAA+PROBE: NEGATIVE
C GLABRATA DNA VAG QL NAA+PROBE: NEGATIVE
C TRACH RRNA SPEC QL NAA+PROBE: NEGATIVE
MEGA1 DNA VAG QL NAA+PROBE: NORMAL SCORE
N GONORRHOEA RRNA SPEC QL NAA+PROBE: NEGATIVE
SPECIMEN SOURCE: NORMAL
T VAGINALIS RRNA SPEC QL NAA+PROBE: NEGATIVE

## 2023-05-13 ENCOUNTER — HOSPITAL ENCOUNTER (OUTPATIENT)
Dept: MAMMOGRAPHY | Age: 58
End: 2023-05-13
Payer: COMMERCIAL

## 2023-05-13 DIAGNOSIS — Z12.31 SCREENING MAMMOGRAM FOR BREAST CANCER: ICD-10-CM

## 2023-05-13 PROCEDURE — 77063 BREAST TOMOSYNTHESIS BI: CPT

## 2023-07-19 ENCOUNTER — OFFICE VISIT (OUTPATIENT)
Dept: FAMILY MEDICINE CLINIC | Facility: CLINIC | Age: 58
End: 2023-07-19
Payer: COMMERCIAL

## 2023-07-19 VITALS
BODY MASS INDEX: 35.76 KG/M2 | HEART RATE: 70 BPM | HEIGHT: 61 IN | TEMPERATURE: 98.2 F | DIASTOLIC BLOOD PRESSURE: 80 MMHG | WEIGHT: 189.4 LBS | OXYGEN SATURATION: 97 % | SYSTOLIC BLOOD PRESSURE: 114 MMHG

## 2023-07-19 DIAGNOSIS — Z00.00 ENCOUNTER FOR WELL WOMAN EXAM WITHOUT GYNECOLOGICAL EXAM: ICD-10-CM

## 2023-07-19 DIAGNOSIS — R73.03 PREDIABETES: Primary | ICD-10-CM

## 2023-07-19 DIAGNOSIS — E66.9 OBESITY (BMI 30-39.9): ICD-10-CM

## 2023-07-19 DIAGNOSIS — Z13.220 ENCOUNTER FOR SCREENING FOR LIPID DISORDER: ICD-10-CM

## 2023-07-19 DIAGNOSIS — Z79.899 MEDICATION MANAGEMENT: ICD-10-CM

## 2023-07-19 DIAGNOSIS — K21.9 GASTROESOPHAGEAL REFLUX DISEASE WITHOUT ESOPHAGITIS: ICD-10-CM

## 2023-07-19 DIAGNOSIS — M72.2 PLANTAR FASCIITIS OF RIGHT FOOT: ICD-10-CM

## 2023-07-19 LAB
ALBUMIN SERPL-MCNC: 4 G/DL (ref 3.2–4.6)
ALBUMIN/GLOB SERPL: 1 (ref 0.4–1.6)
ALP SERPL-CCNC: 72 U/L (ref 50–136)
ALT SERPL-CCNC: 29 U/L (ref 12–65)
ANION GAP SERPL CALC-SCNC: 5 MMOL/L (ref 2–11)
AST SERPL-CCNC: 25 U/L (ref 15–37)
BASOPHILS # BLD: 0 K/UL (ref 0–0.2)
BASOPHILS NFR BLD: 0 % (ref 0–2)
BILIRUB SERPL-MCNC: 0.2 MG/DL (ref 0.2–1.1)
BUN SERPL-MCNC: 16 MG/DL (ref 8–23)
CALCIUM SERPL-MCNC: 9.7 MG/DL (ref 8.3–10.4)
CHLORIDE SERPL-SCNC: 112 MMOL/L (ref 101–110)
CHOLEST SERPL-MCNC: 128 MG/DL
CO2 SERPL-SCNC: 25 MMOL/L (ref 21–32)
CREAT SERPL-MCNC: 0.9 MG/DL (ref 0.6–1)
DIFFERENTIAL METHOD BLD: ABNORMAL
EOSINOPHIL # BLD: 0.2 K/UL (ref 0–0.8)
EOSINOPHIL NFR BLD: 2 % (ref 0.5–7.8)
ERYTHROCYTE [DISTWIDTH] IN BLOOD BY AUTOMATED COUNT: 14.6 % (ref 11.9–14.6)
GLOBULIN SER CALC-MCNC: 4.1 G/DL (ref 2.8–4.5)
GLUCOSE SERPL-MCNC: 92 MG/DL (ref 65–100)
HBA1C MFR BLD: 6 %
HCT VFR BLD AUTO: 41.2 % (ref 35.8–46.3)
HDLC SERPL-MCNC: 74 MG/DL (ref 40–60)
HDLC SERPL: 1.7
HGB BLD-MCNC: 12.9 G/DL (ref 11.7–15.4)
IMM GRANULOCYTES # BLD AUTO: 0 K/UL (ref 0–0.5)
IMM GRANULOCYTES NFR BLD AUTO: 0 % (ref 0–5)
LDLC SERPL CALC-MCNC: 39.6 MG/DL
LYMPHOCYTES # BLD: 3.1 K/UL (ref 0.5–4.6)
LYMPHOCYTES NFR BLD: 38 % (ref 13–44)
MCH RBC QN AUTO: 30.4 PG (ref 26.1–32.9)
MCHC RBC AUTO-ENTMCNC: 31.3 G/DL (ref 31.4–35)
MCV RBC AUTO: 96.9 FL (ref 82–102)
MONOCYTES # BLD: 0.7 K/UL (ref 0.1–1.3)
MONOCYTES NFR BLD: 9 % (ref 4–12)
NEUTS SEG # BLD: 4 K/UL (ref 1.7–8.2)
NEUTS SEG NFR BLD: 51 % (ref 43–78)
NRBC # BLD: 0 K/UL (ref 0–0.2)
PLATELET # BLD AUTO: 262 K/UL (ref 150–450)
PMV BLD AUTO: 11.8 FL (ref 9.4–12.3)
POTASSIUM SERPL-SCNC: 4.5 MMOL/L (ref 3.5–5.1)
PROT SERPL-MCNC: 8.1 G/DL (ref 6.3–8.2)
RBC # BLD AUTO: 4.25 M/UL (ref 4.05–5.2)
SODIUM SERPL-SCNC: 142 MMOL/L (ref 133–143)
TRIGL SERPL-MCNC: 72 MG/DL (ref 35–150)
VLDLC SERPL CALC-MCNC: 14.4 MG/DL (ref 6–23)
WBC # BLD AUTO: 8 K/UL (ref 4.3–11.1)

## 2023-07-19 PROCEDURE — 83036 HEMOGLOBIN GLYCOSYLATED A1C: CPT | Performed by: NURSE PRACTITIONER

## 2023-07-19 PROCEDURE — 99214 OFFICE O/P EST MOD 30 MIN: CPT | Performed by: NURSE PRACTITIONER

## 2023-07-19 RX ORDER — HYDROXYZINE HYDROCHLORIDE 25 MG/1
TABLET, FILM COATED ORAL
COMMUNITY
Start: 2023-05-12

## 2023-07-19 SDOH — ECONOMIC STABILITY: FOOD INSECURITY: WITHIN THE PAST 12 MONTHS, THE FOOD YOU BOUGHT JUST DIDN'T LAST AND YOU DIDN'T HAVE MONEY TO GET MORE.: NEVER TRUE

## 2023-07-19 SDOH — ECONOMIC STABILITY: FOOD INSECURITY: WITHIN THE PAST 12 MONTHS, YOU WORRIED THAT YOUR FOOD WOULD RUN OUT BEFORE YOU GOT MONEY TO BUY MORE.: NEVER TRUE

## 2023-07-19 SDOH — ECONOMIC STABILITY: INCOME INSECURITY: HOW HARD IS IT FOR YOU TO PAY FOR THE VERY BASICS LIKE FOOD, HOUSING, MEDICAL CARE, AND HEATING?: NOT HARD AT ALL

## 2023-07-19 SDOH — ECONOMIC STABILITY: TRANSPORTATION INSECURITY
IN THE PAST 12 MONTHS, HAS LACK OF TRANSPORTATION KEPT YOU FROM MEETINGS, WORK, OR FROM GETTING THINGS NEEDED FOR DAILY LIVING?: NO

## 2023-07-19 SDOH — ECONOMIC STABILITY: HOUSING INSECURITY
IN THE LAST 12 MONTHS, WAS THERE A TIME WHEN YOU DID NOT HAVE A STEADY PLACE TO SLEEP OR SLEPT IN A SHELTER (INCLUDING NOW)?: NO

## 2023-07-19 ASSESSMENT — ENCOUNTER SYMPTOMS
EYES NEGATIVE: 1
GASTROINTESTINAL NEGATIVE: 1
SHORTNESS OF BREATH: 0
BACK PAIN: 1
WHEEZING: 0
APNEA: 0
COUGH: 0

## 2023-07-19 ASSESSMENT — PATIENT HEALTH QUESTIONNAIRE - PHQ9
2. FEELING DOWN, DEPRESSED OR HOPELESS: 1
1. LITTLE INTEREST OR PLEASURE IN DOING THINGS: 0
SUM OF ALL RESPONSES TO PHQ9 QUESTIONS 1 & 2: 1
SUM OF ALL RESPONSES TO PHQ QUESTIONS 1-9: 1
SUM OF ALL RESPONSES TO PHQ QUESTIONS 1-9: 1
2. FEELING DOWN, DEPRESSED OR HOPELESS: SEVERAL DAYS
SUM OF ALL RESPONSES TO PHQ QUESTIONS 1-9: 1
1. LITTLE INTEREST OR PLEASURE IN DOING THINGS: NOT AT ALL
SUM OF ALL RESPONSES TO PHQ9 QUESTIONS 1 & 2: 1
SUM OF ALL RESPONSES TO PHQ QUESTIONS 1-9: 1

## 2023-07-19 NOTE — PROGRESS NOTES
Kyra Peoples is a 59 y.o. female who presents today for the following:  Chief Complaint   Patient presents with    Annual Exam     Pt presents for an annual exam    Rt heel has been hurting x 1 mo. Pt says its a constant pain. No Known Allergies    Current Outpatient Medications   Medication Sig Dispense Refill    diclofenac sodium (VOLTAREN) 1 % GEL Apply 4 g topically 4 times daily 150 g 1    cyclobenzaprine (FLEXERIL) 5 MG tablet Take 1 tablet by mouth 2 times daily as needed      fluticasone (FLONASE) 50 MCG/ACT nasal spray 2 sprays by Nasal route as needed      loratadine (CLARITIN) 10 MG tablet Take 1 tablet by mouth daily as needed      olopatadine (PATADAY) 0.2 % SOLN ophthalmic solution Apply 1 drop to eye daily as needed      triamcinolone (KENALOG) 0.1 % cream Apply topically 2 times daily      hydrOXYzine HCl (ATARAX) 25 MG tablet TAKE 1 TABLET BY MOUTH EVERY 8 HOURS AS NEEDED FOR ITCHING      fluticasone (FLONASE) 50 MCG/ACT nasal spray 2 sprays by Each Nostril route daily 1 each 2     No current facility-administered medications for this visit.        Past Medical History:   Diagnosis Date    Arthritis     in the left knee and heel    Elevated blood pressure reading without diagnosis of hypertension 41/77/0515    Folliculitis 36/7/0393    Folliculitis 31/7/2883    GERD (gastroesophageal reflux disease)     History of itching 3/13/2017    Nail thickening 12/1/2015    Urticaria 3/13/2017       Past Surgical History:   Procedure Laterality Date    COLONOSCOPY N/A 6/7/2019    COLONOSCOPY/ 36 performed by Jareth Keyes MD at [facility] ENDOSCOPY    COLONOSCOPY FLX DX W/COLLJ SPEC WHEN PFRMD  6/7/2019         TONSILLECTOMY  2015       Social History     Tobacco Use    Smoking status: Never    Smokeless tobacco: Never   Substance Use Topics    Alcohol use: No        Family History   Problem Relation Age of Onset    Cancer Sister         lung    Stroke Father     Hypertension Father     Breast

## 2023-09-06 ENCOUNTER — CLINICAL DOCUMENTATION (OUTPATIENT)
Dept: OCCUPATIONAL MEDICINE | Age: 58
End: 2023-09-06

## 2023-09-06 NOTE — PROGRESS NOTES
Patient f/u today to discuss fasting labs    We reviewed her labs from 7/19/23    Next labs not due until 12/2023 fasting with physical for insurance discount    Component Ref Range & Units 7/19/23 1039 7/27/22 1058 3/22/21 1021 9/2/20 1052   Sodium 133 - 143 mmol/L 142  141 R  141 R  139 R    Potassium 3.5 - 5.1 mmol/L 4.5  3.9  3.9 R  4.8 R    Chloride 101 - 110 mmol/L 112 High   108 High  R  105 R  102 R    CO2 21 - 32 mmol/L 25  25  27 R  26 R    Anion Gap 2 - 11 mmol/L 5  8 R      Glucose 65 - 100 mg/dL 92  88  118 High  R  98 R    BUN 8 - 23 MG/DL 16  15 R  14 R  17 R    Creatinine 0.6 - 1.0 MG/DL 0.90  1.00  0.85 R  0.80 R    Est, Glom Filt Rate >60 ml/min/1.73m2 >60       Comment:    Pediatric calculator link: Audrey.at. org/professionals/kdoqi/gfr_calculatorped     These results are not intended for use in patients <25years of age. eGFR results are calculated without a race factor using  the 2021 CKD-EPI equation. Careful clinical correlation is recommended, particularly when comparing to results calculated using previous equations. The CKD-EPI equation is less accurate in patients with extremes of muscle mass, extra-renal metabolism of creatinine, excessive creatine ingestion, or following therapy that affects renal tubular secretion.     Calcium 8.3 - 10.4 MG/DL 9.7  9.7  8.8 R  10.0 R    Total Bilirubin 0.2 - 1.1 MG/DL 0.2  0.2   0.4 R    ALT 12 - 65 U/L 29  24   19 R    AST 15 - 37 U/L 25  19   21 R    Alk Phosphatase 50 - 136 U/L 72  72   72 R    Total Protein 6.3 - 8.2 g/dL 8.1  8.3 High    8.5 R    Albumin 3.2 - 4.6 g/dL 4.0  3.9 R   4.8 R    Globulin 2.8 - 4.5 g/dL 4.1  4.4 High  R      Albumin/Globulin Ratio 0.4 - 1.6   1.0  0.9 Low  R      GFR    >60 R  88 R  95 R    GFR Non-   >60 R, CM      EGFR IF NonAfrican American    76 R  82 R    Bun/Cre Ratio    16 R  21 R    Globulin, Total     3.7 R    Albumin/Globulin Ratio     1.3 R     Component Ref Range

## 2023-09-11 NOTE — PROGRESS NOTES
BENY Ponce is a 59 y.o. female seen for annual GYN exam.    Past Medical History, Past Surgical History, Family history, Social History, and Medications were all reviewed with the patient today and updated as necessary. Current Outpatient Medications   Medication Sig    hydrOXYzine HCl (ATARAX) 25 MG tablet TAKE 1 TABLET BY MOUTH EVERY 8 HOURS AS NEEDED FOR ITCHING    diclofenac sodium (VOLTAREN) 1 % GEL Apply 4 g topically 4 times daily    cyclobenzaprine (FLEXERIL) 5 MG tablet Take 1 tablet by mouth 2 times daily as needed    loratadine (CLARITIN) 10 MG tablet Take 1 tablet by mouth daily as needed    olopatadine (PATADAY) 0.2 % SOLN ophthalmic solution Apply 1 drop to eye daily as needed    triamcinolone (KENALOG) 0.1 % cream Apply topically 2 times daily    fluticasone (FLONASE) 50 MCG/ACT nasal spray 2 sprays by Each Nostril route daily     No current facility-administered medications for this visit.      No Known Allergies  Past Medical History:   Diagnosis Date    Arthritis     in the left knee and heel    Elevated blood pressure reading without diagnosis of hypertension     Folliculitis 9671    Folliculitis 1709    GERD (gastroesophageal reflux disease)     History of itching 3/13/2017    Nail thickening 2015    Urticaria 3/13/2017     Past Surgical History:   Procedure Laterality Date     SECTION      x 2    COLONOSCOPY N/A 2019    COLONOSCOPY/ 36 performed by Mary Lou Jimenez MD at [facility] ENDOSCOPY    COLONOSCOPY FLX DX W/COLLJ SPEC WHEN PFRMD  2019         TONSILLECTOMY  2015     Family History   Problem Relation Age of Onset    Stroke Father     Hypertension Father     Cancer Sister         lung    Breast Cancer Neg Hx       Social History     Tobacco Use    Smoking status: Never    Smokeless tobacco: Never   Substance Use Topics    Alcohol use: No       Social History     Substance and Sexual Activity   Sexual Activity Not Currently     OB History

## 2023-09-13 ENCOUNTER — OFFICE VISIT (OUTPATIENT)
Dept: OBGYN CLINIC | Age: 58
End: 2023-09-13
Payer: COMMERCIAL

## 2023-09-13 VITALS
WEIGHT: 191 LBS | DIASTOLIC BLOOD PRESSURE: 84 MMHG | HEIGHT: 61 IN | BODY MASS INDEX: 36.06 KG/M2 | SYSTOLIC BLOOD PRESSURE: 150 MMHG

## 2023-09-13 DIAGNOSIS — Z12.4 SCREENING FOR MALIGNANT NEOPLASM OF CERVIX: ICD-10-CM

## 2023-09-13 DIAGNOSIS — Z01.419 WELL WOMAN EXAM: Primary | ICD-10-CM

## 2023-09-13 PROCEDURE — 99386 PREV VISIT NEW AGE 40-64: CPT | Performed by: OBSTETRICS & GYNECOLOGY

## 2023-09-19 LAB
CYTOLOGIST CVX/VAG CYTO: NORMAL
CYTOLOGY CVX/VAG DOC THIN PREP: NORMAL
HPV REFLEX: NORMAL
Lab: NORMAL
Lab: NORMAL
PATH REPORT.FINAL DX SPEC: NORMAL
STAT OF ADQ CVX/VAG CYTO-IMP: NORMAL

## 2023-10-25 ENCOUNTER — TELEPHONE (OUTPATIENT)
Dept: OCCUPATIONAL MEDICINE | Age: 58
End: 2023-10-25

## 2023-10-25 DIAGNOSIS — L30.9 DERMATITIS: Primary | ICD-10-CM

## 2023-10-25 DIAGNOSIS — L50.9 HIVES OF UNKNOWN ORIGIN: ICD-10-CM

## 2023-10-25 DIAGNOSIS — L29.9 PRURITUS: ICD-10-CM

## 2023-10-25 RX ORDER — HYDROXYZINE HYDROCHLORIDE 25 MG/1
25 TABLET, FILM COATED ORAL EVERY 8 HOURS PRN
Qty: 30 TABLET | Refills: 0 | Status: SHIPPED | OUTPATIENT
Start: 2023-10-25 | End: 2023-11-04

## 2023-10-25 NOTE — TELEPHONE ENCOUNTER
This is a telehealth visit-    Patient reports having dermatitis and hives intermittently for the past year. Patient reports she is not aware of all her triggers and would like to be referred to get allergy testing. Patient reports this episode of dermatitis started after using a new \"all natural\" body oil. She is reporting itching with hives that intermittently break out on different spots all over her body. Patient denies fevers or chills, SOB, C/P, palpitation, wheezing, swelling in throat/face/tongue    At home b/p - 110/60. HR-86, Temp- 97.9. Telehealth visit- Patient verbalizes NAD  No exam      No Known Allergies      Diagnosis Orders   1. Dermatitis  GT Etienne MD, Allergy & Immunology, [address] [place] [address]      2. Hives of unknown origin  GT Etienne MD, Allergy & Immunology, [place]    hydrOXYzine HCl (ATARAX) 25 MG tablet      3.  Pruritus  hydrOXYzine HCl (ATARAX) 25 MG tablet         She has stopped using the Natural oils but is requesting a refill of Hydroxyzine HCL 25 mg every 8 hours prn, x 7-10 days, #30, NR- prescription sent to [place] per patient request. Reviewed risk vs. Benefits, may cause drowsiness do not operate machinery within 8 hours of taking medication    F/U [facility] symptoms not improving  Urgent Care if worse  F/U ER- SOB, C/P, Palpitations, wheezing, swelling on face, throat , tongue    Referral to Allergy and Immunology  F/U if referral not received    Patient verbalizes understanding and denies questions or concerns

## 2023-12-13 ENCOUNTER — OFFICE VISIT (OUTPATIENT)
Dept: OCCUPATIONAL MEDICINE | Age: 58
End: 2023-12-13

## 2023-12-13 VITALS
SYSTOLIC BLOOD PRESSURE: 122 MMHG | RESPIRATION RATE: 16 BRPM | DIASTOLIC BLOOD PRESSURE: 82 MMHG | OXYGEN SATURATION: 98 % | HEART RATE: 82 BPM | TEMPERATURE: 98.2 F

## 2023-12-13 DIAGNOSIS — J30.2 SEASONAL ALLERGIC RHINITIS, UNSPECIFIED TRIGGER: ICD-10-CM

## 2023-12-13 DIAGNOSIS — Z02.89 EXAMINATION, PHYSICAL, EMPLOYEE: Primary | ICD-10-CM

## 2023-12-13 DIAGNOSIS — H73.893 RETRACTION OF TYMPANIC MEMBRANE OF BOTH EARS: ICD-10-CM

## 2023-12-13 LAB
BILIRUBIN, URINE, POC: NEGATIVE
BLOOD URINE, POC: ABNORMAL
GLUCOSE URINE, POC: NEGATIVE
KETONES, URINE, POC: NEGATIVE
LEUKOCYTE ESTERASE, URINE, POC: NEGATIVE
NITRITE, URINE, POC: NEGATIVE
PH, URINE, POC: 6 (ref 4.6–8)
PROTEIN,URINE, POC: NEGATIVE
SPECIFIC GRAVITY, URINE, POC: 1.03 (ref 1–1.03)
URINALYSIS CLARITY, POC: CLEAR
URINALYSIS COLOR, POC: YELLOW
UROBILINOGEN, POC: NORMAL

## 2023-12-13 RX ORDER — FLUTICASONE PROPIONATE 50 MCG
2 SPRAY, SUSPENSION (ML) NASAL DAILY
Qty: 1 EACH | Refills: 2 | Status: SHIPPED | OUTPATIENT
Start: 2023-12-13 | End: 2024-03-12

## 2023-12-13 ASSESSMENT — ENCOUNTER SYMPTOMS
RHINORRHEA: 0
SORE THROAT: 0
SINUS PAIN: 0
SINUS PRESSURE: 0

## 2024-02-23 ENCOUNTER — OFFICE VISIT (OUTPATIENT)
Age: 59
End: 2024-02-23

## 2024-02-23 DIAGNOSIS — L29.9 ITCHING WITH IRRITATION: ICD-10-CM

## 2024-02-23 DIAGNOSIS — Z76.0 MEDICATION REFILL: ICD-10-CM

## 2024-02-23 DIAGNOSIS — L30.9 DERMATITIS: Primary | ICD-10-CM

## 2024-02-23 DIAGNOSIS — Z76.89 REFERRAL OF PATIENT: ICD-10-CM

## 2024-02-23 RX ORDER — HYDROXYZINE HYDROCHLORIDE 25 MG/1
25 TABLET, FILM COATED ORAL EVERY 8 HOURS PRN
Qty: 30 TABLET | Refills: 0 | Status: SHIPPED | OUTPATIENT
Start: 2024-02-23

## 2024-02-23 NOTE — PROGRESS NOTES
PROGRESS NOTE    SUBJECTIVE:   Maryjane Ba is a 58 y.o. female seen for ____.    Chief Complaint    Medication Refill         Medication Refill  Associated symptoms include a rash. Pertinent negatives include no chills, fatigue, fever, headaches, myalgias or numbness.     Telehealth visit- Patient is requesting a refill of hydroxyzine 25 mg every 8 hours prn for skin irritation and itching. This a ongoing problem and she does not know the cause. She also takes Claritin and Flonase prn for allergies prn.   A referral to an allergist was sent 4/2023 but she reports having problems at home and not able to make the appointment. Patient reports the situation at home have improved and she would like the referral to the Allergist sent again.  She uses Vaseline intensive care lotion and Dove for sensitive skin.  Patient denies any new skin care products, detergents, foods, or pets    Current Outpatient Medications   Medication Sig Dispense Refill    hydrOXYzine HCl (ATARAX) 25 MG tablet Take 1 tablet by mouth every 8 hours as needed for Itching 30 tablet 0    fluticasone (FLONASE) 50 MCG/ACT nasal spray 2 sprays by Each Nostril route daily 1 each 2    loratadine (CLARITIN) 10 MG tablet Take 1 tablet by mouth daily as needed      diclofenac sodium (VOLTAREN) 1 % GEL Apply 4 g topically 4 times daily 150 g 1    cyclobenzaprine (FLEXERIL) 5 MG tablet Take 1 tablet by mouth 2 times daily as needed      olopatadine (PATADAY) 0.2 % SOLN ophthalmic solution Apply 1 drop to eye daily as needed      triamcinolone (KENALOG) 0.1 % cream Apply topically 2 times daily       No current facility-administered medications for this visit.      No Known Allergies    Social History     Tobacco Use    Smoking status: Never    Smokeless tobacco: Never   Substance Use Topics    Alcohol use: No    Drug use: Not Currently        Review of Systems   Constitutional:  Negative for chills, fatigue and fever.   HENT: Negative.

## 2024-05-22 ENCOUNTER — TRANSCRIBE ORDERS (OUTPATIENT)
Dept: SCHEDULING | Age: 59
End: 2024-05-22

## 2024-05-22 DIAGNOSIS — Z12.31 SCREENING MAMMOGRAM FOR HIGH-RISK PATIENT: Primary | ICD-10-CM

## 2024-06-05 ENCOUNTER — OFFICE VISIT (OUTPATIENT)
Age: 59
End: 2024-06-05

## 2024-06-05 VITALS
HEART RATE: 86 BPM | DIASTOLIC BLOOD PRESSURE: 80 MMHG | SYSTOLIC BLOOD PRESSURE: 118 MMHG | RESPIRATION RATE: 14 BRPM | TEMPERATURE: 97.5 F | OXYGEN SATURATION: 99 %

## 2024-06-05 DIAGNOSIS — R39.15 URGENCY OF URINATION: ICD-10-CM

## 2024-06-05 DIAGNOSIS — R35.0 URINARY FREQUENCY: Primary | ICD-10-CM

## 2024-06-05 DIAGNOSIS — R31.9 HEMATURIA, UNSPECIFIED TYPE: ICD-10-CM

## 2024-06-05 LAB
BILIRUBIN, URINE, POC: NEGATIVE
BLOOD URINE, POC: ABNORMAL
GLUCOSE URINE, POC: NEGATIVE
KETONES, URINE, POC: NEGATIVE
LEUKOCYTE ESTERASE, URINE, POC: NEGATIVE
NITRITE, URINE, POC: NEGATIVE
PH, URINE, POC: 5.5 (ref 4.6–8)
PROTEIN,URINE, POC: NEGATIVE
SPECIFIC GRAVITY, URINE, POC: 1.03 (ref 1–1.03)
URINALYSIS CLARITY, POC: ABNORMAL
URINALYSIS COLOR, POC: ABNORMAL
UROBILINOGEN, POC: NORMAL

## 2024-06-05 ASSESSMENT — ENCOUNTER SYMPTOMS
RESPIRATORY NEGATIVE: 1
BLOOD IN STOOL: 0
ABDOMINAL PAIN: 1
CONSTIPATION: 0
NAUSEA: 0
DIARRHEA: 0
ABDOMINAL DISTENTION: 0
VOMITING: 0

## 2024-06-05 NOTE — PROGRESS NOTES
PROGRESS NOTE    SUBJECTIVE:   Maryjane Ba is a 58 y.o. female seen for ____.    Chief Complaint    Cystitis         Urinary Frequency   This is a new problem. The current episode started 1 to 4 weeks ago. The problem occurs every urination. The problem has been gradually improving. The quality of the pain is described as aching. The pain is mild. There has been no fever. She is Not sexually active. There is No history of pyelonephritis. Associated symptoms include frequency and urgency. Pertinent negatives include no chills, discharge, flank pain, hematuria, hesitancy, nausea, sweats or vomiting. Treatments tried: AZO cranberry pills x 1 week and increased water. The treatment provided moderate relief. There is no history of kidney stones. Patient reports her brother has a history of kidney stones   Patient reports having a history of Hematuria and had a workup by her doctor. She states it was benign.    Current Outpatient Medications   Medication Sig Dispense Refill    hydrOXYzine HCl (ATARAX) 25 MG tablet Take 1 tablet by mouth every 8 hours as needed for Itching 30 tablet 0    fluticasone (FLONASE) 50 MCG/ACT nasal spray 2 sprays by Each Nostril route daily 1 each 2    loratadine (CLARITIN) 10 MG tablet Take 1 tablet by mouth daily as needed      diclofenac sodium (VOLTAREN) 1 % GEL Apply 4 g topically 4 times daily (Patient not taking: Reported on 6/5/2024) 150 g 1    cyclobenzaprine (FLEXERIL) 5 MG tablet Take 1 tablet by mouth 2 times daily as needed (Patient not taking: Reported on 6/5/2024)      olopatadine (PATADAY) 0.2 % SOLN ophthalmic solution Apply 1 drop to eye daily as needed (Patient not taking: Reported on 6/5/2024)      triamcinolone (KENALOG) 0.1 % cream Apply topically 2 times daily (Patient not taking: Reported on 6/5/2024)       No current facility-administered medications for this visit.      No Known Allergies    Social History     Tobacco Use    Smoking status: Never    Smokeless

## 2024-06-06 LAB
APPEARANCE UR: CLEAR
BACTERIA #/AREA URNS HPF: ABNORMAL /[HPF]
BILIRUB UR QL STRIP: NEGATIVE
CASTS URNS QL MICRO: ABNORMAL /LPF
COLOR UR: YELLOW
EPI CELLS #/AREA URNS HPF: ABNORMAL /HPF (ref 0–10)
GLUCOSE UR QL STRIP: NEGATIVE
HGB UR QL STRIP: ABNORMAL
KETONES UR QL STRIP: NEGATIVE
LEUKOCYTE ESTERASE UR QL STRIP: NEGATIVE
MICRO URNS: ABNORMAL
NITRITE UR QL STRIP: NEGATIVE
PH UR STRIP: 5.5 [PH] (ref 5–7.5)
PROT UR QL STRIP: NEGATIVE
RBC #/AREA URNS HPF: ABNORMAL /HPF (ref 0–2)
SP GR UR STRIP: 1.03 (ref 1–1.03)
UROBILINOGEN UR STRIP-MCNC: 0.2 MG/DL (ref 0.2–1)
WBC #/AREA URNS HPF: ABNORMAL /HPF (ref 0–5)

## 2024-06-07 LAB — BACTERIA UR CULT: NORMAL

## 2024-06-25 NOTE — RESULT ENCOUNTER NOTE
"Jossy-Wiedemann. Re-engaged Genetics with humerus fracture and dad's report of having OI \"as a child.\" 3/11 gene exome, Damien negative for OI. Dad is positive for OI.      Plan:  Mom and Dad completed cheek swab kits.   4/4 Genetics recommendations:     Follow-up 6-12 months after discharge, no additional recommendations. Please reach out via [facility] for any new questions  Exome re-analysis in 3-5 years.      " Hep C was negative.

## 2024-07-06 ENCOUNTER — HOSPITAL ENCOUNTER (OUTPATIENT)
Dept: MAMMOGRAPHY | Age: 59
End: 2024-07-06
Payer: COMMERCIAL

## 2024-07-06 VITALS — WEIGHT: 188 LBS | HEIGHT: 61 IN | BODY MASS INDEX: 35.5 KG/M2

## 2024-07-06 DIAGNOSIS — Z12.31 SCREENING MAMMOGRAM FOR HIGH-RISK PATIENT: ICD-10-CM

## 2024-07-06 PROCEDURE — 77063 BREAST TOMOSYNTHESIS BI: CPT

## 2024-07-18 ASSESSMENT — PATIENT HEALTH QUESTIONNAIRE - PHQ9
SUM OF ALL RESPONSES TO PHQ QUESTIONS 1-9: 0
SUM OF ALL RESPONSES TO PHQ9 QUESTIONS 1 & 2: 0
SUM OF ALL RESPONSES TO PHQ QUESTIONS 1-9: 0
1. LITTLE INTEREST OR PLEASURE IN DOING THINGS: NOT AT ALL
2. FEELING DOWN, DEPRESSED OR HOPELESS: NOT AT ALL
SUM OF ALL RESPONSES TO PHQ9 QUESTIONS 1 & 2: 0
SUM OF ALL RESPONSES TO PHQ QUESTIONS 1-9: 0
2. FEELING DOWN, DEPRESSED OR HOPELESS: NOT AT ALL
1. LITTLE INTEREST OR PLEASURE IN DOING THINGS: NOT AT ALL
SUM OF ALL RESPONSES TO PHQ QUESTIONS 1-9: 0

## 2024-07-19 ENCOUNTER — OFFICE VISIT (OUTPATIENT)
Dept: FAMILY MEDICINE CLINIC | Facility: CLINIC | Age: 59
End: 2024-07-19
Payer: COMMERCIAL

## 2024-07-19 VITALS
WEIGHT: 195 LBS | SYSTOLIC BLOOD PRESSURE: 130 MMHG | OXYGEN SATURATION: 98 % | HEART RATE: 76 BPM | HEIGHT: 61 IN | DIASTOLIC BLOOD PRESSURE: 74 MMHG | BODY MASS INDEX: 36.82 KG/M2

## 2024-07-19 DIAGNOSIS — G89.29 CHRONIC PAIN OF RIGHT KNEE: ICD-10-CM

## 2024-07-19 DIAGNOSIS — R73.03 PREDIABETES: Primary | ICD-10-CM

## 2024-07-19 DIAGNOSIS — M72.2 PLANTAR FASCIITIS OF RIGHT FOOT: ICD-10-CM

## 2024-07-19 DIAGNOSIS — J30.2 SEASONAL ALLERGIC RHINITIS, UNSPECIFIED TRIGGER: ICD-10-CM

## 2024-07-19 DIAGNOSIS — E66.9 OBESITY (BMI 30-39.9): ICD-10-CM

## 2024-07-19 DIAGNOSIS — L50.9 URTICARIA: ICD-10-CM

## 2024-07-19 DIAGNOSIS — M25.561 CHRONIC PAIN OF RIGHT KNEE: ICD-10-CM

## 2024-07-19 DIAGNOSIS — R73.03 PREDIABETES: ICD-10-CM

## 2024-07-19 DIAGNOSIS — K21.9 GASTROESOPHAGEAL REFLUX DISEASE WITHOUT ESOPHAGITIS: ICD-10-CM

## 2024-07-19 DIAGNOSIS — Z13.220 ENCOUNTER FOR SCREENING FOR LIPID DISORDER: ICD-10-CM

## 2024-07-19 PROBLEM — M17.11 ARTHRITIS OF RIGHT KNEE: Status: ACTIVE | Noted: 2022-10-14

## 2024-07-19 PROBLEM — M19.079 ARTHRITIS OF ANKLE: Status: RESOLVED | Noted: 2022-10-14 | Resolved: 2024-07-19

## 2024-07-19 PROBLEM — B35.3 TINEA PEDIS OF BOTH FEET: Status: RESOLVED | Noted: 2022-07-21 | Resolved: 2024-07-19

## 2024-07-19 LAB
ALBUMIN SERPL-MCNC: 4.1 G/DL (ref 3.2–4.6)
ALBUMIN/GLOB SERPL: 1.1 (ref 1–1.9)
ALP SERPL-CCNC: 76 U/L (ref 35–104)
ALT SERPL-CCNC: 29 U/L (ref 12–65)
ANION GAP SERPL CALC-SCNC: 10 MMOL/L (ref 9–18)
AST SERPL-CCNC: 30 U/L (ref 15–37)
BASOPHILS # BLD: 0 K/UL (ref 0–0.2)
BASOPHILS NFR BLD: 1 % (ref 0–2)
BILIRUB SERPL-MCNC: 0.2 MG/DL (ref 0–1.2)
BUN SERPL-MCNC: 14 MG/DL (ref 8–23)
CALCIUM SERPL-MCNC: 9.7 MG/DL (ref 8.8–10.2)
CHLORIDE SERPL-SCNC: 104 MMOL/L (ref 98–107)
CHOLEST SERPL-MCNC: 129 MG/DL (ref 0–200)
CO2 SERPL-SCNC: 27 MMOL/L (ref 20–28)
CREAT SERPL-MCNC: 0.8 MG/DL (ref 0.6–1.1)
DIFFERENTIAL METHOD BLD: ABNORMAL
EOSINOPHIL # BLD: 0.1 K/UL (ref 0–0.8)
EOSINOPHIL NFR BLD: 2 % (ref 0.5–7.8)
ERYTHROCYTE [DISTWIDTH] IN BLOOD BY AUTOMATED COUNT: 14.5 % (ref 11.9–14.6)
GLOBULIN SER CALC-MCNC: 3.7 G/DL (ref 2.3–3.5)
GLUCOSE SERPL-MCNC: 91 MG/DL (ref 70–99)
HBA1C MFR BLD: 5.9 %
HCT VFR BLD AUTO: 42.2 % (ref 35.8–46.3)
HDLC SERPL-MCNC: 70 MG/DL (ref 40–60)
HDLC SERPL: 1.9 (ref 0–5)
HGB BLD-MCNC: 12.9 G/DL (ref 11.7–15.4)
IMM GRANULOCYTES # BLD AUTO: 0 K/UL (ref 0–0.5)
IMM GRANULOCYTES NFR BLD AUTO: 0 % (ref 0–5)
LDLC SERPL CALC-MCNC: 42 MG/DL (ref 0–100)
LYMPHOCYTES # BLD: 2.7 K/UL (ref 0.5–4.6)
LYMPHOCYTES NFR BLD: 37 % (ref 13–44)
MCH RBC QN AUTO: 30.1 PG (ref 26.1–32.9)
MCHC RBC AUTO-ENTMCNC: 30.6 G/DL (ref 31.4–35)
MCV RBC AUTO: 98.6 FL (ref 82–102)
MONOCYTES # BLD: 0.7 K/UL (ref 0.1–1.3)
MONOCYTES NFR BLD: 10 % (ref 4–12)
NEUTS SEG # BLD: 3.8 K/UL (ref 1.7–8.2)
NEUTS SEG NFR BLD: 51 % (ref 43–78)
NRBC # BLD: 0 K/UL (ref 0–0.2)
PLATELET # BLD AUTO: 259 K/UL (ref 150–450)
PMV BLD AUTO: 11.5 FL (ref 9.4–12.3)
POTASSIUM SERPL-SCNC: 4.8 MMOL/L (ref 3.5–5.1)
PROT SERPL-MCNC: 7.9 G/DL (ref 6.3–8.2)
RBC # BLD AUTO: 4.28 M/UL (ref 4.05–5.2)
SODIUM SERPL-SCNC: 141 MMOL/L (ref 136–145)
TRIGL SERPL-MCNC: 88 MG/DL (ref 0–150)
VLDLC SERPL CALC-MCNC: 18 MG/DL (ref 6–23)
WBC # BLD AUTO: 7.4 K/UL (ref 4.3–11.1)

## 2024-07-19 PROCEDURE — 99214 OFFICE O/P EST MOD 30 MIN: CPT | Performed by: NURSE PRACTITIONER

## 2024-07-19 PROCEDURE — 83036 HEMOGLOBIN GLYCOSYLATED A1C: CPT | Performed by: NURSE PRACTITIONER

## 2024-07-19 RX ORDER — FLUTICASONE PROPIONATE 50 MCG
2 SPRAY, SUSPENSION (ML) NASAL DAILY
Qty: 1 EACH | Refills: 2 | Status: SHIPPED | OUTPATIENT
Start: 2024-07-19 | End: 2024-10-17

## 2024-07-19 SDOH — ECONOMIC STABILITY: FOOD INSECURITY: WITHIN THE PAST 12 MONTHS, YOU WORRIED THAT YOUR FOOD WOULD RUN OUT BEFORE YOU GOT MONEY TO BUY MORE.: NEVER TRUE

## 2024-07-19 SDOH — ECONOMIC STABILITY: FOOD INSECURITY: WITHIN THE PAST 12 MONTHS, THE FOOD YOU BOUGHT JUST DIDN'T LAST AND YOU DIDN'T HAVE MONEY TO GET MORE.: NEVER TRUE

## 2024-07-19 SDOH — ECONOMIC STABILITY: INCOME INSECURITY: HOW HARD IS IT FOR YOU TO PAY FOR THE VERY BASICS LIKE FOOD, HOUSING, MEDICAL CARE, AND HEATING?: NOT HARD AT ALL

## 2024-07-19 ASSESSMENT — ENCOUNTER SYMPTOMS
GASTROINTESTINAL NEGATIVE: 1
RESPIRATORY NEGATIVE: 1
EYES NEGATIVE: 1

## 2024-07-19 NOTE — PATIENT INSTRUCTIONS
amounts of poultry and eggs.  Choose healthy (unsaturated) fats, such as nuts, olive oil, and certain nut or seed oils like canola, soybean, and flaxseed.  Limit unhealthy (saturated) fats, such as butter, palm oil, and coconut oil. And limit fats found in animal products, such as meat and dairy products made with whole milk. Try to eat red meat only a few times a month in very small amounts.  Limit sweets and desserts to only a few times a week. This includes sugar-sweetened drinks like soda.  The Mediterranean diet may also include red wine with your meal--1 glass each day for women and up to 2 glasses a day for men.  Tips for eating at home  Use herbs, spices, garlic, lemon zest, and citrus juice instead of salt to add flavor to foods.  Add avocado slices to your sandwich instead of lamb.  Have fish for lunch or dinner instead of red meat. Brush the fish with olive oil, and broil or grill it.  Sprinkle your salad with seeds or nuts instead of cheese.  Cook with olive or canola oil instead of butter or oils that are high in saturated fat.  Switch from 2% milk or whole milk to 1% or fat-free milk.  Dip raw vegetables in a vinaigrette dressing or hummus instead of dips made from mayonnaise or sour cream.  Have a piece of fruit for dessert instead of a piece of cake. Try baked apples, or have some dried fruit.  Tips for eating out  Try broiled, grilled, baked, or poached fish instead of having it fried or breaded.  Ask your  to have your meals prepared with olive oil instead of butter.  Order dishes made with marinara sauce or sauces made from olive oil. Avoid sauces made from cream or mayonnaise.  Choose whole-grain breads, whole wheat pasta and pizza crust, brown rice, beans, and lentils.  Cut back on butter or margarine on bread. Instead, you can dip your bread in a small amount of olive oil.  Ask for a side salad or grilled vegetables instead of french fries or chips.  Where can you learn more?  Go to

## 2024-07-19 NOTE — PROGRESS NOTES
Maryjane Ba is a 59 y.o. female who presents today for the following:  Chief Complaint   Patient presents with    Diabetes    Follow-up     Pt present for 3 month follow up.       No Known Allergies    Current Outpatient Medications   Medication Sig Dispense Refill    Fexofenadine HCl (ALLEGRA ALLERGY PO) Take by mouth 1 tab twice per day over the counter recommended by allergist.      fluticasone (FLONASE) 50 MCG/ACT nasal spray 2 sprays by Each Nostril route daily 1 each 2    diclofenac sodium (VOLTAREN) 1 % GEL Apply 4 g topically 4 times daily 150 g 1     No current facility-administered medications for this visit.       Past Medical History:   Diagnosis Date    Arthritis     in the left knee and heel    Arthritis of ankle 10/14/2022    Elevated blood pressure reading without diagnosis of hypertension 11/10/2016    Folliculitis 2015    Folliculitis 2015    GERD (gastroesophageal reflux disease)     History of itching 3/13/2017    Nail thickening 2015    Plantar fasciitis of right foot 2023    Urticaria 3/13/2017       Past Surgical History:   Procedure Laterality Date     SECTION      x 2    COLONOSCOPY N/A 2019    COLONOSCOPY/ 36 performed by Jonas Rodrigez MD at [facility] ENDOSCOPY    COLONOSCOPY FLX DX W/COLLJ SPEC WHEN PFRMD  2019         TONSILLECTOMY         Social History     Tobacco Use    Smoking status: Never    Smokeless tobacco: Never   Substance Use Topics    Alcohol use: No        Family History   Problem Relation Age of Onset    Stroke Father     Hypertension Father     Cancer Sister         lung    Breast Cancer Neg Hx        Pt presents for follow up annual labs for conditions listed, gets annual physical from her work.  She has hx of prediabetes, chronic back pain, knee arthritis (10 years dx right knee), urticaria, obesity, heel pain resolved from last year right and GERD.  Mammogram completed this month, normal.  Last pap  was normal.  Last

## 2024-10-30 ENCOUNTER — OFFICE VISIT (OUTPATIENT)
Age: 59
End: 2024-10-30

## 2024-10-30 VITALS
SYSTOLIC BLOOD PRESSURE: 128 MMHG | TEMPERATURE: 98.2 F | RESPIRATION RATE: 18 BRPM | OXYGEN SATURATION: 98 % | HEART RATE: 88 BPM | DIASTOLIC BLOOD PRESSURE: 88 MMHG

## 2024-10-30 DIAGNOSIS — S86.911A STRAIN OF RIGHT KNEE, INITIAL ENCOUNTER: ICD-10-CM

## 2024-10-30 DIAGNOSIS — M25.561 ACUTE PAIN OF RIGHT KNEE: Primary | ICD-10-CM

## 2024-10-30 RX ORDER — IBUPROFEN 600 MG/1
600 TABLET, FILM COATED ORAL 3 TIMES DAILY PRN
Qty: 20 TABLET | Refills: 0 | Status: SHIPPED | OUTPATIENT
Start: 2024-10-30

## 2024-10-30 RX ORDER — FAMOTIDINE 10 MG
10 TABLET ORAL 2 TIMES DAILY
COMMUNITY

## 2024-10-30 ASSESSMENT — ENCOUNTER SYMPTOMS: RESPIRATORY NEGATIVE: 1

## 2024-10-30 NOTE — PROGRESS NOTES
encounter  -     ibuprofen (ADVIL;MOTRIN) 600 MG tablet; Take 1 tablet by mouth 3 times daily as needed for Pain    RICE therapy reveiwed  Rest  Ice as needed for 10-15 intervals.  Biofreeze as needed as directed  ACE wrap when up walking  Elevate when sitting    Ibuprofen 600 mg as directed as needed with food for 1-2 weeks    Do not wear heels  Avoid crossing legs and avoid using stairs  Wear comfortable supportive shoes    Follow up in 1-2 weeks if not improving, sooner if worsening    Follow up  with Primary Care Provider if the Wellness clinic is closed    Counseled on benefits of having a primary care provider which includes, but is not limited to, continuity of care and having a medical home when concerns arise. Also enforced that onsite clinic policy states that we are not to take the place of a primary care provider, Maryjane verbalized understanding.     Side effects and risk versus benefits associated with medications prescribed discussed with Maryjane, who verbalized understanding and she verbalized understanding and agreement with plan of care. Return to the clinic for persisting/worsening symptoms or new complaints that arise. Discussed signs and symptoms that would warrant immediate evaluation including, but not limited to headache, blurred vision, speech disturbance, difficulty with ambulation/gait, numbness, tingling, weakness, syncope, chest pain, or shortness of breath.    I have reviewed the Maryjane medication list, past medical, family, social, and surgical history in detail and updated the patient record appropriately.    Melissa Gay, APRN - CNP

## 2025-02-26 ENCOUNTER — OFFICE VISIT (OUTPATIENT)
Age: 60
End: 2025-02-26

## 2025-02-26 VITALS
RESPIRATION RATE: 16 BRPM | HEART RATE: 81 BPM | SYSTOLIC BLOOD PRESSURE: 118 MMHG | TEMPERATURE: 97.8 F | DIASTOLIC BLOOD PRESSURE: 73 MMHG | OXYGEN SATURATION: 97 %

## 2025-02-26 DIAGNOSIS — Z02.89 EXAMINATION, PHYSICAL, EMPLOYEE: Primary | ICD-10-CM

## 2025-02-26 RX ORDER — CLOBETASOL PROPIONATE 0.5 MG/G
OINTMENT TOPICAL 2 TIMES DAILY PRN
COMMUNITY
Start: 2024-08-07

## 2025-02-26 ASSESSMENT — ENCOUNTER SYMPTOMS: SHORTNESS OF BREATH: 0

## 2025-02-26 NOTE — PROGRESS NOTES
[facility]  ONSITE CLINIC  PROGRESS NOTE        PROGRESS NOTE    SUBJECTIVE:   Maryjane Ba is a 59 y.o. female seen in employee Wellness Clinic at [employer] for required employment physical for insurance discount.    Current Outpatient Medications   Medication Sig Dispense Refill    clobetasol (TEMOVATE) 0.05 % ointment Apply topically 2 times daily as needed (for rash and itching)      ibuprofen (ADVIL;MOTRIN) 600 MG tablet Take 1 tablet by mouth 3 times daily as needed for Pain 20 tablet 0    Fexofenadine HCl (ALLEGRA ALLERGY PO) Take by mouth 1 tab twice per day over the counter recommended by allergist.      fluticasone (FLONASE) 50 MCG/ACT nasal spray 2 sprays by Each Nostril route daily 1 each 2    diclofenac sodium (VOLTAREN) 1 % GEL Apply 4 g topically 4 times daily 150 g 1    famotidine (PEPCID) 10 MG tablet Take 1 tablet by mouth 2 times daily (Patient not taking: Reported on 2/26/2025)       No current facility-administered medications for this visit.        No Known Allergies    Social History     Tobacco Use    Smoking status: Never    Smokeless tobacco: Never   Vaping Use    Vaping status: Never Used   Substance Use Topics    Alcohol use: No    Drug use: Not Currently        Past Medical History:   Diagnosis Date    Arthritis     in the left knee and heel    Arthritis of ankle 10/14/2022    Elevated blood pressure reading without diagnosis of hypertension 11/10/2016    Folliculitis 12/1/2015    Folliculitis 12/1/2015    GERD (gastroesophageal reflux disease)     History of itching 3/13/2017    Nail thickening 12/1/2015    Plantar fasciitis of right foot 07/19/2023    Urticaria 3/13/2017        Review of Systems   Constitutional:  Negative for chills, fatigue and fever.   Respiratory:  Negative for shortness of breath.    Cardiovascular: Negative.  Negative for chest pain.   Musculoskeletal:  Negative for myalgias.   Skin:         Patient reports a bump under her right arm.

## 2025-05-14 DIAGNOSIS — J30.2 SEASONAL ALLERGIC RHINITIS, UNSPECIFIED TRIGGER: ICD-10-CM

## 2025-05-14 RX ORDER — FLUTICASONE PROPIONATE 50 MCG
2 SPRAY, SUSPENSION (ML) NASAL DAILY
Qty: 1 EACH | Refills: 1 | Status: SHIPPED | OUTPATIENT
Start: 2025-05-14 | End: 2025-08-12

## 2025-05-14 NOTE — TELEPHONE ENCOUNTER
Patient called requesting a refill of Flonase nasal spray. She takes Flonase daily during allergy season and helps to control nasal congestion. Denies Side effects with this medication.    Request refill sent to [facility].    Denies complaints of fever/chill, upper respiratory symptoms signs and symptoms/signs and symptoms, shortness of breath, chest pain, palpitations    Flonase Nasal Spray 2 sprays each nostrill daily, #1, RF x 1    No Known Allergies      Diagnosis Orders   1. Seasonal allergic rhinitis, unspecified trigger  fluticasone (FLONASE) 50 MCG/ACT nasal spray         Take as prescribed. Follow up with Primary Care Provider as scheduled

## 2025-07-16 ENCOUNTER — OFFICE VISIT (OUTPATIENT)
Age: 60
End: 2025-07-16

## 2025-07-16 DIAGNOSIS — Z13.29 SCREENING FOR THYROID DISORDER: ICD-10-CM

## 2025-07-16 DIAGNOSIS — Z13.220 SCREENING FOR LIPID DISORDERS: ICD-10-CM

## 2025-07-16 DIAGNOSIS — Z00.00 LABORATORY EXAM ORDERED AS PART OF ROUTINE GENERAL MEDICAL EXAMINATION: Primary | ICD-10-CM

## 2025-07-16 ASSESSMENT — ENCOUNTER SYMPTOMS
RESPIRATORY NEGATIVE: 1
CHEST TIGHTNESS: 0
SHORTNESS OF BREATH: 0

## 2025-07-16 NOTE — PROGRESS NOTES
[facility]  ONSITE CLINIC  PROGRESS NOTE    SUBJECTIVE:   Maryjane Ba is a 60 y.o. female seen here in the onsite clinic at her place of employment, [employer]. she has a Primary Care Provider that she sees regularly, Ronna Carvajal APRN - CNP.          HPI  Patient her employee physical for insurance discount on 2/26/25. It was too early for fasting labs. She is follow up today to complete her annual fasting labs to complete requirement for insurance discount.    Denies complaints of or concerns    Current Outpatient Medications   Medication Sig Dispense Refill    fluticasone (FLONASE) 50 MCG/ACT nasal spray 2 sprays by Each Nostril route daily 1 each 1    clobetasol (TEMOVATE) 0.05 % ointment Apply topically 2 times daily as needed (for rash and itching)      famotidine (PEPCID) 10 MG tablet Take 1 tablet by mouth 2 times daily (Patient not taking: Reported on 2/26/2025)      ibuprofen (ADVIL;MOTRIN) 600 MG tablet Take 1 tablet by mouth 3 times daily as needed for Pain 20 tablet 0    Fexofenadine HCl (ALLEGRA ALLERGY PO) Take by mouth 1 tab twice per day over the counter recommended by allergist.      diclofenac sodium (VOLTAREN) 1 % GEL Apply 4 g topically 4 times daily (Patient not taking: Reported on 7/16/2025) 150 g 1     No current facility-administered medications for this visit.      No Known Allergies    Social History     Tobacco Use    Smoking status: Never    Smokeless tobacco: Never   Vaping Use    Vaping status: Never Used   Substance Use Topics    Alcohol use: No    Drug use: Not Currently        Review of Systems   Constitutional:  Negative for fatigue and fever.   Respiratory: Negative.  Negative for chest tightness and shortness of breath.    Cardiovascular: Negative.  Negative for chest pain and palpitations.   Musculoskeletal:  Negative for myalgias.   Neurological:  Negative for dizziness, weakness, light-headedness and headaches.   All other systems reviewed and are  Other Procedure: right upper arm Procedure To Be Performed At Next Visit: Biopsy by punch method Detail Level: Detailed Introduction Text (Please End With A Colon): Defer:

## 2025-07-17 LAB
ALBUMIN SERPL-MCNC: 4.4 G/DL (ref 3.9–4.9)
ALP SERPL-CCNC: 78 IU/L (ref 44–121)
ALT SERPL-CCNC: 28 IU/L (ref 0–32)
AST SERPL-CCNC: 23 IU/L (ref 0–40)
BASOPHILS # BLD AUTO: 0 X10E3/UL (ref 0–0.2)
BASOPHILS NFR BLD AUTO: 0 %
BILIRUB SERPL-MCNC: 0.2 MG/DL (ref 0–1.2)
BUN SERPL-MCNC: 12 MG/DL (ref 8–27)
BUN/CREAT SERPL: 17 (ref 12–28)
CALCIUM SERPL-MCNC: 9.5 MG/DL (ref 8.7–10.3)
CHLORIDE SERPL-SCNC: 104 MMOL/L (ref 96–106)
CHOLEST SERPL-MCNC: 137 MG/DL (ref 100–199)
CO2 SERPL-SCNC: 23 MMOL/L (ref 20–29)
CREAT SERPL-MCNC: 0.7 MG/DL (ref 0.57–1)
EGFRCR SERPLBLD CKD-EPI 2021: 98 ML/MIN/1.73
EOSINOPHIL # BLD AUTO: 0.2 X10E3/UL (ref 0–0.4)
EOSINOPHIL NFR BLD AUTO: 2 %
ERYTHROCYTE [DISTWIDTH] IN BLOOD BY AUTOMATED COUNT: 13.4 % (ref 11.7–15.4)
GLOBULIN SER CALC-MCNC: 2.8 G/DL (ref 1.5–4.5)
GLUCOSE SERPL-MCNC: 90 MG/DL (ref 70–99)
HCT VFR BLD AUTO: 39.9 % (ref 34–46.6)
HDLC SERPL-MCNC: 69 MG/DL
HGB BLD-MCNC: 12.5 G/DL (ref 11.1–15.9)
IMM GRANULOCYTES # BLD AUTO: 0 X10E3/UL (ref 0–0.1)
IMM GRANULOCYTES NFR BLD AUTO: 0 %
LDLC SERPL CALC-MCNC: 52 MG/DL (ref 0–99)
LDLC/HDLC SERPL: 0.8 RATIO (ref 0–3.2)
LYMPHOCYTES # BLD AUTO: 3 X10E3/UL (ref 0.7–3.1)
LYMPHOCYTES NFR BLD AUTO: 39 %
MCH RBC QN AUTO: 30 PG (ref 26.6–33)
MCHC RBC AUTO-ENTMCNC: 31.3 G/DL (ref 31.5–35.7)
MCV RBC AUTO: 96 FL (ref 79–97)
MONOCYTES # BLD AUTO: 0.7 X10E3/UL (ref 0.1–0.9)
MONOCYTES NFR BLD AUTO: 9 %
NEUTROPHILS # BLD AUTO: 3.9 X10E3/UL (ref 1.4–7)
NEUTROPHILS NFR BLD AUTO: 50 %
PLATELET # BLD AUTO: 256 X10E3/UL (ref 150–450)
POTASSIUM SERPL-SCNC: 4.8 MMOL/L (ref 3.5–5.2)
PROT SERPL-MCNC: 7.2 G/DL (ref 6–8.5)
RBC # BLD AUTO: 4.17 X10E6/UL (ref 3.77–5.28)
SODIUM SERPL-SCNC: 140 MMOL/L (ref 134–144)
TRIGL SERPL-MCNC: 81 MG/DL (ref 0–149)
TSH SERPL DL<=0.005 MIU/L-ACNC: 1.65 UIU/ML (ref 0.45–4.5)
VLDLC SERPL CALC-MCNC: 16 MG/DL (ref 5–40)
WBC # BLD AUTO: 7.9 X10E3/UL (ref 3.4–10.8)

## 2025-07-22 ENCOUNTER — TRANSCRIBE ORDERS (OUTPATIENT)
Facility: HOSPITAL | Age: 60
End: 2025-07-22

## 2025-07-22 DIAGNOSIS — Z12.31 VISIT FOR SCREENING MAMMOGRAM: Primary | ICD-10-CM

## 2025-07-23 ENCOUNTER — TELEPHONE (OUTPATIENT)
Age: 60
End: 2025-07-23

## 2025-07-23 DIAGNOSIS — Z71.2 ENCOUNTER TO DISCUSS TEST RESULTS: Primary | ICD-10-CM

## 2025-07-23 NOTE — TELEPHONE ENCOUNTER
Called Maryjane to follow up on her lab results from 7/16/25    All labs normal    Keep all follow up appointments with Primary Care Provider  Take medications as prescribed    Follow up in the Wellness Clinic as needed     Maryjane denies questions or concerns    Results for orders placed or performed in visit on 07/16/25   CBC with Auto Differential   Result Value Ref Range    WBC 7.9 3.4 - 10.8 x10E3/uL    RBC 4.17 3.77 - 5.28 x10E6/uL    Hemoglobin 12.5 11.1 - 15.9 g/dL    Hematocrit 39.9 34.0 - 46.6 %    MCV 96 79 - 97 fL    MCH 30.0 26.6 - 33.0 pg    MCHC 31.3 (L) 31.5 - 35.7 g/dL    RDW 13.4 11.7 - 15.4 %    Platelets 256 150 - 450 x10E3/uL    Neutrophils % 50 Not Estab. %    Lymphocytes % 39 Not Estab. %    Monocytes % 9 Not Estab. %    Eosinophils % 2 Not Estab. %    Basophils % 0 Not Estab. %    Neutrophils Absolute 3.9 1.4 - 7.0 x10E3/uL    Lymphocytes Absolute 3.0 0.7 - 3.1 x10E3/uL    Monocytes Absolute 0.7 0.1 - 0.9 x10E3/uL    Eosinophils Absolute 0.2 0.0 - 0.4 x10E3/uL    Basophils Absolute 0.0 0.0 - 0.2 x10E3/uL    Immature Granulocytes % 0 Not Estab. %    Immature Grans (Abs) 0.0 0.0 - 0.1 x10E3/uL   TSH   Result Value Ref Range    TSH 1.650 0.450 - 4.500 uIU/mL   Comprehensive Metabolic Panel   Result Value Ref Range    Glucose 90 70 - 99 mg/dL    BUN 12 8 - 27 mg/dL    Creatinine 0.70 0.57 - 1.00 mg/dL    Est, Glom Filt Rate 98 >59 mL/min/1.73    BUN/Creatinine Ratio 17 12 - 28    Sodium 140 134 - 144 mmol/L    Potassium 4.8 3.5 - 5.2 mmol/L    Chloride 104 96 - 106 mmol/L    CO2 23 20 - 29 mmol/L    Calcium 9.5 8.7 - 10.3 mg/dL    Total Protein 7.2 6.0 - 8.5 g/dL    Albumin 4.4 3.9 - 4.9 g/dL    Globulin, Total 2.8 1.5 - 4.5 g/dL    Total Bilirubin 0.2 0.0 - 1.2 mg/dL    Alkaline Phosphatase 78 44 - 121 IU/L    AST 23 0 - 40 IU/L    ALT 28 0 - 32 IU/L   Lipid Panel with LDL/HDL Ratio   Result Value Ref Range    Cholesterol, Total 137 100 - 199 mg/dL    Triglycerides 81 0 - 149 mg/dL    HDL 69 >39

## 2025-08-21 ENCOUNTER — HOSPITAL ENCOUNTER (OUTPATIENT)
Dept: MAMMOGRAPHY | Age: 60
Discharge: HOME | End: 2025-08-24
Payer: COMMERCIAL

## 2025-08-21 DIAGNOSIS — Z12.31 VISIT FOR SCREENING MAMMOGRAM: ICD-10-CM

## 2025-08-21 PROCEDURE — 77063 BREAST TOMOSYNTHESIS BI: CPT

## 2025-09-02 ENCOUNTER — RESULTS FOLLOW-UP (OUTPATIENT)
Dept: FAMILY MEDICINE CLINIC | Facility: CLINIC | Age: 60
End: 2025-09-02

## 2025-09-02 DIAGNOSIS — Z12.31 BREAST CANCER SCREENING BY MAMMOGRAM: Primary | ICD-10-CM

## (undated) DEVICE — CONNECTOR TBNG OD5-7MM O2 END DISP

## (undated) DEVICE — SYR 5ML 1/5 GRAD LL NSAF LF --

## (undated) DEVICE — NDL PRT INJ NSAF BLNT 18GX1.5 --

## (undated) DEVICE — CANNULA NSL ORAL AD FOR CAPNOFLEX CO2 O2 AIRLFE

## (undated) DEVICE — KENDALL RADIOLUCENT FOAM MONITORING ELECTRODE RECTANGULAR SHAPE: Brand: KENDALL

## (undated) DEVICE — SYR 3ML LL TIP 1/10ML GRAD --